# Patient Record
Sex: FEMALE | Race: WHITE | NOT HISPANIC OR LATINO
[De-identification: names, ages, dates, MRNs, and addresses within clinical notes are randomized per-mention and may not be internally consistent; named-entity substitution may affect disease eponyms.]

---

## 2018-04-30 ENCOUNTER — APPOINTMENT (OUTPATIENT)
Dept: PHYSICAL MEDICINE AND REHAB | Facility: CLINIC | Age: 48
End: 2018-04-30
Payer: MEDICAID

## 2018-04-30 VITALS — BODY MASS INDEX: 20.49 KG/M2 | RESPIRATION RATE: 16 BRPM | HEIGHT: 64 IN | WEIGHT: 120 LBS

## 2018-04-30 PROCEDURE — 20550 NJX 1 TENDON SHEATH/LIGAMENT: CPT | Mod: 59

## 2018-04-30 PROCEDURE — 76942 ECHO GUIDE FOR BIOPSY: CPT | Mod: LT,59

## 2018-04-30 PROCEDURE — 20611 DRAIN/INJ JOINT/BURSA W/US: CPT | Mod: LT

## 2018-04-30 PROCEDURE — 99204 OFFICE O/P NEW MOD 45 MIN: CPT | Mod: 25

## 2018-07-19 ENCOUNTER — APPOINTMENT (OUTPATIENT)
Dept: PHYSICAL MEDICINE AND REHAB | Facility: CLINIC | Age: 48
End: 2018-07-19

## 2018-08-30 ENCOUNTER — APPOINTMENT (OUTPATIENT)
Dept: PHYSICAL MEDICINE AND REHAB | Facility: CLINIC | Age: 48
End: 2018-08-30
Payer: MEDICAID

## 2018-08-30 VITALS — RESPIRATION RATE: 16 BRPM | BODY MASS INDEX: 20.49 KG/M2 | WEIGHT: 120 LBS | HEIGHT: 64 IN

## 2018-08-30 PROCEDURE — 20611 DRAIN/INJ JOINT/BURSA W/US: CPT | Mod: LT

## 2018-08-30 PROCEDURE — 99214 OFFICE O/P EST MOD 30 MIN: CPT | Mod: 25

## 2018-11-15 ENCOUNTER — EMERGENCY (EMERGENCY)
Facility: HOSPITAL | Age: 48
LOS: 1 days | Discharge: HOME | End: 2018-11-15
Attending: EMERGENCY MEDICINE | Admitting: EMERGENCY MEDICINE
Payer: MEDICAID

## 2018-11-15 VITALS
HEART RATE: 79 BPM | TEMPERATURE: 98 F | RESPIRATION RATE: 16 BRPM | OXYGEN SATURATION: 98 % | DIASTOLIC BLOOD PRESSURE: 88 MMHG | SYSTOLIC BLOOD PRESSURE: 115 MMHG

## 2018-11-15 VITALS
OXYGEN SATURATION: 97 % | DIASTOLIC BLOOD PRESSURE: 85 MMHG | HEART RATE: 92 BPM | SYSTOLIC BLOOD PRESSURE: 126 MMHG | RESPIRATION RATE: 18 BRPM | TEMPERATURE: 97 F

## 2018-11-15 DIAGNOSIS — J02.9 ACUTE PHARYNGITIS, UNSPECIFIED: ICD-10-CM

## 2018-11-15 DIAGNOSIS — Z91.018 ALLERGY TO OTHER FOODS: ICD-10-CM

## 2018-11-15 DIAGNOSIS — M25.512 PAIN IN LEFT SHOULDER: ICD-10-CM

## 2018-11-15 LAB
APPEARANCE UR: CLEAR — SIGNIFICANT CHANGE UP
BILIRUB UR-MCNC: NEGATIVE — SIGNIFICANT CHANGE UP
COLOR SPEC: YELLOW — SIGNIFICANT CHANGE UP
DIFF PNL FLD: ABNORMAL
GLUCOSE UR QL: NEGATIVE — SIGNIFICANT CHANGE UP
KETONES UR-MCNC: NEGATIVE — SIGNIFICANT CHANGE UP
LEUKOCYTE ESTERASE UR-ACNC: ABNORMAL
NITRITE UR-MCNC: NEGATIVE — SIGNIFICANT CHANGE UP
PH UR: 6 — SIGNIFICANT CHANGE UP (ref 5–8)
PROT UR-MCNC: NEGATIVE MG/DL — SIGNIFICANT CHANGE UP
S PYO AG SPEC QL IA: NEGATIVE — SIGNIFICANT CHANGE UP
SP GR SPEC: <=1.005 — SIGNIFICANT CHANGE UP (ref 1–1.03)
UROBILINOGEN FLD QL: 0.2 E.U./DL — SIGNIFICANT CHANGE UP

## 2018-11-15 PROCEDURE — 99283 EMERGENCY DEPT VISIT LOW MDM: CPT

## 2018-11-15 PROCEDURE — 73030 X-RAY EXAM OF SHOULDER: CPT | Mod: 26,LT

## 2018-11-15 RX ORDER — KETOROLAC TROMETHAMINE 30 MG/ML
30 SYRINGE (ML) INJECTION ONCE
Qty: 0 | Refills: 0 | Status: DISCONTINUED | OUTPATIENT
Start: 2018-11-15 | End: 2018-11-15

## 2018-11-15 RX ADMIN — Medication 30 MILLIGRAM(S): at 15:08

## 2018-11-15 NOTE — ED ADULT TRIAGE NOTE - CHIEF COMPLAINT QUOTE
pt states she has left arm pain x 1 year but aggravated it yesterday helping someone with a shopping cart, also has a sore throat with a horse voice, and thinks she may have a UTI.

## 2018-11-15 NOTE — ED PROVIDER NOTE - OBJECTIVE STATEMENT
48 year old female presents to ED with concern for left shoulder discomfort after pulling/lifting movement yesterday evening.  Patient states she has a previous injury to left shoulder which she is followed by an orthopedist for and feels that she exacerbated this pain yesterday.  She denies direct trauma to arm, neck pain, paresthesias into extremity, decreased  strength or changes in ability to range shoulder.  She does note increased pain with range of motion, however full ROM is intact.  She took aleeve yesterday for her discomfort with some improvement in pain. 48 year old female presents to ED with concern for left shoulder discomfort after pulling/lifting movement yesterday evening.  Patient states she has a previous injury to left shoulder which she is followed by an orthopedist for and feels that she exacerbated this pain yesterday.  She denies direct trauma to arm, neck pain, paresthesias into extremity, decreased  strength or changes in ability to range shoulder.  She does note increased pain with range of motion, however full ROM is intact.  She took aleeve yesterday for her discomfort with some improvement in pain.  She also complains of sore throat and is concerned she may have a UTI.  She denies increased frequency of urination, fever, chills or back pain.  + Intermittent bladder fullness.

## 2018-11-15 NOTE — ED PROVIDER NOTE - CARE PROVIDER_API CALL
Bharat Floyd (MD), [facility]  Orthopedics  [address]  [address] [place] [address]  Phone: [phone]  Fax: [phone]

## 2018-11-15 NOTE — ED PROVIDER NOTE - MUSCULOSKELETAL, MLM
Spine appears normal.  There is no midline cervical, thoracic or lumbar spine pain/tenderness/stepoff/deformity.  Range of motion is not limited, however + pain with complete ABduction of left UE.  + TTP over anterior lateral aspect of left shoulder.  No deformity.

## 2018-11-15 NOTE — ED PROVIDER NOTE - CARE PLAN
Principal Discharge DX:	Left shoulder pain, unspecified chronicity  Secondary Diagnosis:	Sore throat

## 2018-11-15 NOTE — ED PROVIDER NOTE - MEDICAL DECISION MAKING DETAILS
Patient in ED w c/o shoulder pain, possible UTI and sore throat.  UA with small amount of bacteria - cx sent.  I spoke w pt re small amount of bacteria, she denies dyuria or any urinary symptoms at this time and states "abx really mess me up, I don't want to take them if I don't have to."  Will await cx results prior to treating given patient now stating she is asymptomatic.  X ray appears unremarkable.  Orthopedist in ED and also reviews images.  Patient is requesting a new orthopedist, and Dr. Floyd does stop by to see patient while she is in ED.  Will discharge at this time w instruction for prompt ortho follow up as she is aware she will likely requre another MRI to eval for any further injury.  Will provide sling as patient is stating she cannot leave without one.  She is warned re concern for development of frozen shoulder and will only wear part time.  Will also provide percocet for breakthrough pain and patient stating she will take tylenol as needed - noting she does not like motrin.  Patient is aware of plan and agreeable.  Will discharge at this time.

## 2018-11-15 NOTE — ED PROVIDER NOTE - NEUROLOGICAL, MLM
Alert and oriented, no focal deficits, no motor or sensory deficits.  5/5  strength to bilateral UEs without discrepancy.  Sensation intact and symmetric to bilateral UEs.  Radial pulses are intact and symmetric to bilateral UEs.

## 2018-11-17 RX ORDER — FLUCONAZOLE 150 MG/1
1 TABLET ORAL
Qty: 1 | Refills: 2 | OUTPATIENT
Start: 2018-11-17 | End: 2018-11-19

## 2018-11-17 RX ORDER — SACCHAROMYCES BOULARDII 250 MG
1 POWDER IN PACKET (EA) ORAL
Qty: 30 | Refills: 2 | OUTPATIENT
Start: 2018-11-17 | End: 2019-02-14

## 2018-11-17 RX ORDER — CEFUROXIME AXETIL 250 MG
1 TABLET ORAL
Qty: 6 | Refills: 0 | OUTPATIENT
Start: 2018-11-17 | End: 2018-11-19

## 2018-11-19 ENCOUNTER — APPOINTMENT (OUTPATIENT)
Dept: PHYSICAL MEDICINE AND REHAB | Facility: CLINIC | Age: 48
End: 2018-11-19
Payer: MEDICAID

## 2018-11-19 VITALS
RESPIRATION RATE: 16 BRPM | HEART RATE: 113 BPM | HEIGHT: 64 IN | WEIGHT: 120 LBS | BODY MASS INDEX: 20.49 KG/M2 | OXYGEN SATURATION: 98 %

## 2018-11-19 PROCEDURE — 99214 OFFICE O/P EST MOD 30 MIN: CPT

## 2018-11-19 RX ORDER — NAPROXEN 500 MG/1
500 TABLET ORAL
Qty: 60 | Refills: 1 | Status: DISCONTINUED | COMMUNITY
Start: 2018-08-30 | End: 2018-11-19

## 2019-10-12 ENCOUNTER — EMERGENCY (EMERGENCY)
Facility: HOSPITAL | Age: 49
LOS: 1 days | Discharge: HOME | End: 2019-10-12
Attending: EMERGENCY MEDICINE | Admitting: EMERGENCY MEDICINE
Payer: MEDICAID

## 2019-10-12 VITALS
DIASTOLIC BLOOD PRESSURE: 81 MMHG | HEART RATE: 97 BPM | SYSTOLIC BLOOD PRESSURE: 123 MMHG | RESPIRATION RATE: 16 BRPM | WEIGHT: 130.07 LBS | TEMPERATURE: 98 F | OXYGEN SATURATION: 98 %

## 2019-10-12 PROCEDURE — 99283 EMERGENCY DEPT VISIT LOW MDM: CPT

## 2019-10-12 PROCEDURE — 73030 X-RAY EXAM OF SHOULDER: CPT | Mod: 26,LT

## 2019-10-12 NOTE — ED PROVIDER NOTE - PATIENT PORTAL LINK FT
You can access the FollowMyHealth Patient Portal offered by [facility] by registering at the following website: [email or web address]. By joining [employer]’s FollowMyHealth portal, you will also be able to view your health information using other applications (apps) compatible with our system.

## 2019-10-12 NOTE — ED PROVIDER NOTE - OBJECTIVE STATEMENT
48 y/o Female presents to ED c/o L shoulder pain and a sore throat. Pt states her children were sick last week and she may have obtained their cold. Denies fever, chill, cough, and congestion. Pt states she tore part of her L bicep in the past and currently feels discomfort in her L shoulder. Denies numbness and weakness. Pt is compliant with going to her physical therapy appointments and visiting her orthopedist. Pt is requesting for an XR.

## 2019-10-12 NOTE — ED PROVIDER NOTE - CLINICAL SUMMARY MEDICAL DECISION MAKING FREE TEXT BOX
50 y/o Female presents to ED with sore throat and L shoulder pain. Will order L shoulder XR. Advised pt to take Tylenol for pain and if she obtains a fever. Will give pt a sling and advised her to follow up with orthopedist.

## 2019-10-12 NOTE — ED PROVIDER NOTE - CARE PLAN
Principal Discharge DX:	Viral pharyngitis  Secondary Diagnosis:	Shoulder strain, left, initial encounter

## 2019-10-17 ENCOUNTER — APPOINTMENT (OUTPATIENT)
Dept: PHYSICAL MEDICINE AND REHAB | Facility: CLINIC | Age: 49
End: 2019-10-17
Payer: MEDICAID

## 2019-10-17 VITALS
WEIGHT: 120 LBS | HEART RATE: 91 BPM | OXYGEN SATURATION: 98 % | HEIGHT: 64 IN | RESPIRATION RATE: 16 BRPM | BODY MASS INDEX: 20.49 KG/M2
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
## 2019-10-17 DIAGNOSIS — J02.9 ACUTE PHARYNGITIS, UNSPECIFIED: ICD-10-CM

## 2019-10-17 DIAGNOSIS — S46.912A STRAIN OF UNSPECIFIED MUSCLE, FASCIA AND TENDON AT SHOULDER AND UPPER ARM LEVEL, LEFT ARM, INITIAL ENCOUNTER: ICD-10-CM
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
## 2019-10-17 DIAGNOSIS — M25.512 PAIN IN LEFT SHOULDER: ICD-10-CM
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
## 2019-10-17 PROCEDURE — 99214 OFFICE O/P EST MOD 30 MIN: CPT

## 2021-02-17 NOTE — ED POST DISCHARGE NOTE - DETAILS
Still having sx, hx freq UTIs, dn want macrobid. Ceftin Rx sent. Quality 110: Preventive Care And Screening: Influenza Immunization: Influenza immunization was not ordered or administered, reason not given Detail Level: Detailed Quality 226: Preventive Care And Screening: Tobacco Use: Screening And Cessation Intervention: Patient screened for tobacco use and is an ex/non-smoker Quality 130: Documentation Of Current Medications In The Medical Record: Current Medications Documented Quality 431: Preventive Care And Screening: Unhealthy Alcohol Use - Screening: Patient screened for unhealthy alcohol use using a single question and scores less than 2 times per year Quality 131: Pain Assessment And Follow-Up: Pain assessment using a standardized tool is documented as negative, no follow-up plan required

## 2021-03-30 NOTE — ED ADULT NURSE NOTE - NSSISCREENINGQ4_ED_A_ED
"    Assessment & Plan     Flank pain  Transient with echoes of his past kidney stone. UA negative today.  Plan to do US to assess for hydronephrosis. If positive, might warrant repeat CT scan.     - Urinalysis, Micro If (UA) ([facility])  - US Renal Complete; Future    Urgency incontinence and leakage  His PVR was negative post voiding today.   No evidence of back pain or neurologic deficit.  Prostate does not feel large, minimal BPH symptoms, and past low PSA. Repeat today.   His resting tone slightly lower with ability to tense so reviewed doing frequent Kegels. Has appointment with Urology in the next 6 weeks and will help them determine next steps if improved.    To use the nystatin as needed if increased irritation and aware that needs to be seen more urgently if balanitis findings.   I will see if there are providers in urology he can see here.   - nystatin (MYCOSTATIN) 165043 UNIT/GM external cream; Apply topically 4 times daily as needed for dry skin  - Prostate spec antigen screen    Gender dysphoria  - working with Dr. Pierre through .              BMI:   Estimated body mass index is 34.16 kg/m  as calculated from the following:    Height as of this encounter: 1.73 m (5' 8.11\").    Weight as of this encounter: 102.2 kg (225 lb 6.4 oz).           No follow-ups on file.    Yanna Santizo MD  [facility] ASHLEY Hill is a 63 year old who presents for the following health issues     HPI   Has had frequent, small amount of urinary incontinence for 3 months - wearing shields to deal with that. Out of the blue, if squats and stands up, in bed at night. Finds herself wet.  Kegels - has started recently and not all the time.   Doesn't feel like she fully empties - has an urge and there is very little and then the urge has not gone away.   Nocturia - up at one, three and six and small volumes  Denies difficulty starting stream  No intimate partner for a long time  Can get an erection if " "works at it but difficult and rare to orgasm. Not much matsturbation. Would really like to have a relationship.   Then noted pain at the tip of her penis - can be there anytime, is happening now, can be after urinating.  Not there all the time. 2 - 6 times. Sometimes a second and sometimes it persists and tries to urinate to see if that helps, cleans but doesn't help.     Had UA which was negative a few weeks ago.     Middle of last week felt like had kidney stone episode. Woke up at 1 am then by 3 am lots of pain and had to vomit and then back.  Abdomen and front right groin, eventually migrated to right flank.  Very similar to episode in 2011 when diagnosed with 2 stones.  No blood in urine. Not seen. Work up at that time included cystoscopy.   1 year ago in May had ED visit for stone. CT positive for 0.6 to 0.3 cm stone in right UVJ. Never passed the stone.      Nl UA recently and [facility] BMP [facility].   [facility]  Also some family history - my Dad who is 94 (Mom is 97) confirmed that his bladder cancer happened in 1999 and came back in 2000 - he would have been a young 72 back then, and is still a pretty young 94. His history also includes gallbladder, a double bypass, a stent, a pacemaker,  oh, and his tonsillectomy in 1930.  - thanks, Liz            Review of Systems         Objective    /79   Pulse 61   Temp 98.7  F (37.1  C) (Oral)   Ht 1.73 m (5' 8.11\")   Wt 102.2 kg (225 lb 6.4 oz)   SpO2 95%   BMI 34.16 kg/m    Body mass index is 34.16 kg/m .  Physical Exam   GENERAL: healthy, alert and no distress  CV: regular rate and rhythm, normal S1 S2, no S3 or S4, no murmur, click or rub, no peripheral edema and peripheral pulses strong  : small penis with redundant foreskin that is wet on exam. Urethra without irritation and meatus appears normal size. Testes smaller, soft, nt, Nl sensation over the perineum  RECTAL (male): lower resting sphincter tone with normal ability to tighten, no " rectal masses, prostate normal size, smooth, nontender without nodules or masses  MS: no gross musculoskeletal defects noted, no edema    Results for orders placed or performed in visit on 03/30/21 (from the past 24 hour(s))   Urinalysis, Micro If (UA) ([facility])   Result Value Ref Range    Specific Gravity Urine >=1.030 1.005 - 1.030    pH Urine 5.0 4.5 - 8.0    Leukocyte Esterase UR Negative NEGATIVE    Nitrite Urine Negative NEGATIVE mg/dL    Protein UR Negative NEGATIVE mg/dL    Glucose Urine Negative NEGATIVE    Ketones Urine Negative NEGATIVE mg/dL    Urobilinogen mg/dL 0.2 E.U./dL 0.2 E.U./dL E.U./dL    Bilirubin UR Negative NEGATIVE mg/dL    Blood UR Negative NEGATIVE mg/dL                No

## 2021-11-19 ENCOUNTER — EMERGENCY (EMERGENCY)
Facility: HOSPITAL | Age: 51
LOS: 1 days | Discharge: HOME | End: 2021-11-19
Admitting: EMERGENCY MEDICINE
Payer: MEDICAID

## 2021-11-19 VITALS
OXYGEN SATURATION: 100 % | HEART RATE: 68 BPM | DIASTOLIC BLOOD PRESSURE: 78 MMHG | SYSTOLIC BLOOD PRESSURE: 107 MMHG | RESPIRATION RATE: 18 BRPM

## 2021-11-19 VITALS
DIASTOLIC BLOOD PRESSURE: 81 MMHG | HEART RATE: 105 BPM | TEMPERATURE: 98 F | SYSTOLIC BLOOD PRESSURE: 133 MMHG | RESPIRATION RATE: 18 BRPM | OXYGEN SATURATION: 96 %
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
## 2021-11-19 DIAGNOSIS — Z91.018 ALLERGY TO OTHER FOODS: ICD-10-CM

## 2021-11-19 DIAGNOSIS — S92.912A UNSPECIFIED FRACTURE OF LEFT TOE(S), INITIAL ENCOUNTER FOR CLOSED FRACTURE: ICD-10-CM

## 2021-11-19 DIAGNOSIS — M79.675 PAIN IN LEFT TOE(S): ICD-10-CM

## 2021-11-19 PROCEDURE — 73630 X-RAY EXAM OF FOOT: CPT | Mod: 26,LT

## 2021-11-19 PROCEDURE — 73660 X-RAY EXAM OF TOE(S): CPT | Mod: 26,LT,59

## 2021-11-19 PROCEDURE — 99284 EMERGENCY DEPT VISIT MOD MDM: CPT | Mod: 25

## 2021-11-19 RX ORDER — SUMATRIPTAN SUCCINATE 4 MG/.5ML
6 INJECTION, SOLUTION SUBCUTANEOUS ONCE
Refills: 0 | Status: COMPLETED | OUTPATIENT
Start: 2021-11-19 | End: 2021-11-19

## 2021-11-19 RX ORDER — KETOROLAC TROMETHAMINE 30 MG/ML
15 SYRINGE (ML) INJECTION ONCE
Refills: 0 | Status: DISCONTINUED | OUTPATIENT
Start: 2021-11-19 | End: 2021-11-19

## 2021-11-19 RX ADMIN — Medication 15 MILLIGRAM(S): at 22:19

## 2021-11-19 RX ADMIN — SUMATRIPTAN SUCCINATE 6 MILLIGRAM(S): 4 INJECTION, SOLUTION SUBCUTANEOUS at 22:19

## 2021-11-19 NOTE — ED PROVIDER NOTE - CARE PROVIDER_API CALL
Estrellita Paniagua (EDMAR)  Surgery Orthopaedic Surgery  [address] [place] [address], [address]  [place] [address]  Phone: [phone]  Fax: [phone]  Follow Up Time:

## 2021-11-19 NOTE — ED PROVIDER NOTE - CLINICAL SUMMARY MEDICAL DECISION MAKING FREE TEXT BOX
pt presents c/o left foot pain, mostly the 5th digit, also with migraine. previously on triptan but none taken today. will give toradol and sumatriptan. will obtain xray foot and toes.     [facility] shows interval healing of the sesamoid bone but worsening of the spiral fracture of the 5th digit along the same fracture line.

## 2021-11-19 NOTE — ED ADULT TRIAGE NOTE - CHIEF COMPLAINT QUOTE
Pt walked in c/o L fifth toe pain. States struck toe against furniture. Hx of fracture to the same toe in july 2020.

## 2021-11-19 NOTE — ED PROVIDER NOTE - NSFOLLOWUPINSTRUCTIONS_ED_ALL_ED_FT
Fracture    A fracture is a break in one of your bones. This can occur from a variety of injuries, especially traumatic ones. Symptoms include pain, bruising, or swelling. Do not use the injured limb. If a fracture is in one of the bones below your waist, do not put weight on that limb unless instructed to do so by your healthcare provider. Crutches or a cane may have been provided. A splint or cast may have been applied by your health care provider. Make sure to keep it dry and follow up with an orthopedist as instructed.    SEEK IMMEDIATE MEDICAL CARE IF YOU HAVE ANY OF THE FOLLOWING SYMPTOMS: numbness, tingling, increasing pain, or weakness in any part of the injured limb.    CONTINUE TO FRIDA TAPE YOUR TOES AS WE DID WITH GAUZE BETWEEN TO HELP RESIST ROTATION.     WEAR HARD SOLE SHOE.     FOLLOW UP WITH PODIATRIST. OUR CARE COORDINATOR WILL REACH OUT TO YOU IN THE NEXT COUPLE OF DAYS TO HELP FACILITATE AN APPOINTMENT.

## 2021-11-19 NOTE — ED PROVIDER NOTE - PHYSICAL EXAMINATION
Constitutional: Well appearing, awake, alert, oriented to person, place, time/situation and in no apparent distress.  HEENT: Airway patent, NCAT  Resp: no conversational dyspnea, tachypnea, or signs of respiratory distress  Msk: left foot with bruising of the 5th digit and the base of the 4th digit with deformity noted, medially rotated, no other focal bony tenderness, but occasional tenderness over the ball of the foot, no tenderness to the metatarsals  Neuro: A&Ox3

## 2021-11-19 NOTE — ED PROVIDER NOTE - PATIENT PORTAL LINK FT
You can access the FollowMyHealth Patient Portal offered by [facility] by registering at the following website: [email or web address]. By joining [employer]’s FollowMyHealth portal, you will also be able to view your health information using other applications (apps) compatible with our system.

## 2021-11-19 NOTE — ED PROVIDER NOTE - OBJECTIVE STATEMENT
52yo F with h/o multiple fractured toes and migraines presents with c/o left 5th toe fracture 3 days ago. pt notes she banged her toe into a piece of furniture and she had a spiral fracture to the toe earlier this year as well as a sesamoid bone fracture earlier this year. pt describes pain diffusely to her foot. she also notes it has triggered a migraine for which she used to take triptans but was unable to continue her rx 2/2 insurance issues. 50yo F with h/o multiple fractured toes and migraines presents with c/o left 5th toe fracture 3 days ago. pt notes she banged her toe into a piece of furniture and she had a spiral fracture to the toe earlier this year as well as a sesamoid bone fracture earlier this year. pt describes pain diffusely to her foot. she also notes it has triggered a migraine for which she used to take triptans but was unable to continue her rx 2/2 insurance issues. reports symptoms are typical of her migraines, no new symptoms, no vomiting, dizziness, cp, sob.

## 2021-11-19 NOTE — ED ADULT NURSE NOTE - NSIMPLEMENTINTERV_GEN_ALL_ED
Implemented All Universal Safety Interventions:  [place] to call system. Call bell, personal items and telephone within reach. Instruct patient to call for assistance. Room bathroom lighting operational. Non-slip footwear when patient is off stretcher. Physically safe environment: no spills, clutter or unnecessary equipment. Stretcher in lowest position, wheels locked, appropriate side rails in place.

## 2021-11-19 NOTE — ED PROVIDER NOTE - PROGRESS NOTE DETAILS
reviewed old x ray on [facility] which shows interval healing of the sesamoid bone but worsening of the spiral fracture of the 5th digit along the same fracture line.

## 2021-12-13 ENCOUNTER — EMERGENCY (EMERGENCY)
Facility: HOSPITAL | Age: 51
LOS: 1 days | Discharge: HOME | End: 2021-12-13
Admitting: EMERGENCY MEDICINE
Payer: MEDICAID

## 2021-12-13 VITALS
WEIGHT: 130.07 LBS | OXYGEN SATURATION: 95 % | HEART RATE: 93 BPM | SYSTOLIC BLOOD PRESSURE: 121 MMHG | HEIGHT: 64 IN | TEMPERATURE: 98 F | DIASTOLIC BLOOD PRESSURE: 66 MMHG | RESPIRATION RATE: 16 BRPM

## 2021-12-13 VITALS
TEMPERATURE: 98 F | HEART RATE: 86 BPM | DIASTOLIC BLOOD PRESSURE: 73 MMHG | SYSTOLIC BLOOD PRESSURE: 128 MMHG | RESPIRATION RATE: 18 BRPM | OXYGEN SATURATION: 97 %

## 2021-12-13 DIAGNOSIS — M79.672 PAIN IN LEFT FOOT: ICD-10-CM
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
## 2021-12-13 DIAGNOSIS — S01.81XA LACERATION WITHOUT FOREIGN BODY OF OTHER PART OF HEAD, INITIAL ENCOUNTER: ICD-10-CM
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
## 2021-12-13 DIAGNOSIS — Z23 ENCOUNTER FOR IMMUNIZATION: ICD-10-CM
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
## 2021-12-13 DIAGNOSIS — Z87.81 PERSONAL HISTORY OF (HEALED) TRAUMATIC FRACTURE: ICD-10-CM

## 2021-12-13 PROBLEM — G43.909 MIGRAINE, UNSPECIFIED, NOT INTRACTABLE, WITHOUT STATUS MIGRAINOSUS: Chronic | Status: ACTIVE | Noted: 2021-11-19

## 2021-12-13 PROCEDURE — 73630 X-RAY EXAM OF FOOT: CPT | Mod: 26,LT

## 2021-12-13 PROCEDURE — 73620 X-RAY EXAM OF FOOT: CPT | Mod: 26,LT

## 2021-12-13 PROCEDURE — 99283 EMERGENCY DEPT VISIT LOW MDM: CPT | Mod: 25

## 2021-12-13 RX ORDER — TETANUS TOXOID, REDUCED DIPHTHERIA TOXOID AND ACELLULAR PERTUSSIS VACCINE, ADSORBED 5; 2.5; 8; 8; 2.5 [IU]/.5ML; [IU]/.5ML; UG/.5ML; UG/.5ML; UG/.5ML
0.5 SUSPENSION INTRAMUSCULAR ONCE
Refills: 0 | Status: COMPLETED | OUTPATIENT
Start: 2021-12-13 | End: 2021-12-13

## 2021-12-13 RX ADMIN — TETANUS TOXOID, REDUCED DIPHTHERIA TOXOID AND ACELLULAR PERTUSSIS VACCINE, ADSORBED 0.5 MILLILITER(S): 5; 2.5; 8; 8; 2.5 SUSPENSION INTRAMUSCULAR at 14:51

## 2021-12-13 NOTE — ED PROVIDER NOTE - CARE PROVIDER_API CALL
Estrellita Paniagua (EDMAR)  Surgery Orthopaedic Surgery  [address] [place] [address], [address]  [place] [address]  Phone: [phone]  Fax: [phone]  Follow Up Time:    Estrellita Paniagua)  Surgery Orthopaedic Surgery  [address] [place] [address], [address]  [place] [address]  Phone: [phone]  Fax: [phone]  Follow Up Time:     Semaj Medina)  Orthopaedic Surgery  [address] [place] [address]  Phone: [phone]  Fax: [phone]  Follow Up Time:

## 2021-12-13 NOTE — ED ADULT NURSE NOTE - NSIMPLEMENTINTERV_GEN_ALL_ED
Implemented All Universal Safety Interventions:  [place] to call system. Call bell, personal items and telephone within reach. Instruct patient to call for assistance. Room bathroom lighting operational. Non-slip footwear when patient is off stretcher. Physically safe environment: no spills, clutter or unnecessary equipment. Stretcher in lowest position, wheels locked, appropriate side rails in place.

## 2021-12-13 NOTE — ED PROVIDER NOTE - OBJECTIVE STATEMENT
50 y/o female with PMHx of chronic foot pain, with left 5th toe fracture and left sesamoid bone fracture sustained 1 month ago presents with laceration tot he forehead. Patient states that at 12 midnight last night, she was vacuuming on her bed and hit her head with a metal cannister of the vacuum. Patient denies fall, LOC, nausea, vomiting, and her last tetanus shot is unknown. Patient presents with laceration, and states that since she couldn't get a follow up appointment with her podiatrist, she is also requesting a re-evaluation of her foot.

## 2021-12-13 NOTE — ED ADULT TRIAGE NOTE - CHIEF COMPLAINT QUOTE
Pt c/o head injury. Pulled Metal canister from vaccum  inter her forehead. Lac to forehead. No LOC, no blood thinners. Separately. Pt still experiencing Lt toe pain from fracture one month ago

## 2021-12-13 NOTE — ED PROVIDER NOTE - CARE PROVIDERS DIRECT ADDRESSES
,DirectAddress_Unknown ,DirectAddress_Unknown,[email or web address]

## 2021-12-13 NOTE — ED PROVIDER NOTE - PATIENT PORTAL LINK FT
You can access the FollowMyHealth Patient Portal offered by [facility] by registering at the following website: [email or web address]. By joining [employer]’s FollowMyHealth portal, you will also be able to view your health information using other applications (apps) compatible with our system.

## 2021-12-13 NOTE — ED ADULT NURSE NOTE - CHIEF COMPLAINT
Pt sustained head impact with vacuum canister and hit her forehead. Pt has small laceration to area. pt did not sustain loc and denies blood thinner use. Pt denies dizziness, headache, n/v, sob, fever/chills, cough.

## 2021-12-13 NOTE — ED PROVIDER NOTE - PROVIDER TOKENS
PROVIDER:[TOKEN:[[id number]]] PROVIDER:[TOKEN:[[id number]]],PROVIDER:[TOKEN:[[id number]]]

## 2021-12-13 NOTE — ED PROVIDER NOTE - CLINICAL SUMMARY MEDICAL DECISION MAKING FREE TEXT BOX
Patient with superficial laceration over the forehead sustained last night at midnight. Wound irrigated with normal saline a betadine. Steri-strips applied, tetanus updated, repeat X-rays of foot and toe negative for acute changes. Advised follow up with podiatry.

## 2021-12-13 NOTE — ED PROVIDER NOTE - SKIN, MLM
+superficial laceration to the forehead near the hairline which is 2 cm in length with no active bleeding, no edema, no erythema. Left foot skin intact with no ecchymosis or edema.

## 2022-12-11 ENCOUNTER — EMERGENCY (EMERGENCY)
Facility: HOSPITAL | Age: 52
LOS: 1 days | Discharge: HOME | End: 2022-12-11
Admitting: EMERGENCY MEDICINE
Payer: COMMERCIAL

## 2022-12-11 VITALS
RESPIRATION RATE: 16 BRPM | SYSTOLIC BLOOD PRESSURE: 125 MMHG | OXYGEN SATURATION: 97 % | HEIGHT: 64 IN | DIASTOLIC BLOOD PRESSURE: 76 MMHG | HEART RATE: 86 BPM | WEIGHT: 130.07 LBS | TEMPERATURE: 98 F

## 2022-12-11 DIAGNOSIS — Z91.018 ALLERGY TO OTHER FOODS: ICD-10-CM
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
## 2022-12-11 DIAGNOSIS — S80.01XA CONTUSION OF RIGHT KNEE, INITIAL ENCOUNTER: ICD-10-CM

## 2022-12-11 DIAGNOSIS — R45.1 RESTLESSNESS AND AGITATION: ICD-10-CM
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
## 2022-12-11 DIAGNOSIS — S80.02XA CONTUSION OF LEFT KNEE, INITIAL ENCOUNTER: ICD-10-CM

## 2022-12-11 DIAGNOSIS — S60.052A CONTUSION OF LEFT LITTLE FINGER WITHOUT DAMAGE TO NAIL, INITIAL ENCOUNTER: ICD-10-CM

## 2022-12-11 DIAGNOSIS — M25.522 PAIN IN LEFT ELBOW: ICD-10-CM

## 2022-12-11 DIAGNOSIS — G43.909 MIGRAINE, UNSPECIFIED, NOT INTRACTABLE, WITHOUT STATUS MIGRAINOSUS: ICD-10-CM

## 2022-12-11 DIAGNOSIS — F41.9 ANXIETY DISORDER, UNSPECIFIED: ICD-10-CM

## 2022-12-11 DIAGNOSIS — M25.512 PAIN IN LEFT SHOULDER: ICD-10-CM

## 2022-12-11 DIAGNOSIS — R07.81 PLEURODYNIA: ICD-10-CM

## 2022-12-11 PROCEDURE — 73030 X-RAY EXAM OF SHOULDER: CPT

## 2022-12-11 PROCEDURE — 73140 X-RAY EXAM OF FINGER(S): CPT

## 2022-12-11 PROCEDURE — 71100 X-RAY EXAM RIBS UNI 2 VIEWS: CPT | Mod: 26,LT

## 2022-12-11 PROCEDURE — 99284 EMERGENCY DEPT VISIT MOD MDM: CPT | Mod: 25

## 2022-12-11 PROCEDURE — 73562 X-RAY EXAM OF KNEE 3: CPT | Mod: 26,50

## 2022-12-11 PROCEDURE — 73080 X-RAY EXAM OF ELBOW: CPT

## 2022-12-11 PROCEDURE — 73140 X-RAY EXAM OF FINGER(S): CPT | Mod: 26,LT

## 2022-12-11 PROCEDURE — 71100 X-RAY EXAM RIBS UNI 2 VIEWS: CPT

## 2022-12-11 PROCEDURE — 73080 X-RAY EXAM OF ELBOW: CPT | Mod: 26,LT

## 2022-12-11 PROCEDURE — 73030 X-RAY EXAM OF SHOULDER: CPT | Mod: 26

## 2022-12-11 PROCEDURE — 73030 X-RAY EXAM OF SHOULDER: CPT | Mod: 26,LT

## 2022-12-11 PROCEDURE — 73562 X-RAY EXAM OF KNEE 3: CPT

## 2022-12-11 PROCEDURE — 71046 X-RAY EXAM CHEST 2 VIEWS: CPT

## 2022-12-11 PROCEDURE — 71046 X-RAY EXAM CHEST 2 VIEWS: CPT | Mod: 26

## 2022-12-11 PROCEDURE — 96372 THER/PROPH/DIAG INJ SC/IM: CPT

## 2022-12-11 RX ORDER — KETOROLAC TROMETHAMINE 30 MG/ML
15 SYRINGE (ML) INJECTION ONCE
Refills: 0 | Status: DISCONTINUED | OUTPATIENT
Start: 2022-12-11 | End: 2022-12-11

## 2022-12-11 RX ADMIN — Medication 15 MILLIGRAM(S): at 15:42

## 2022-12-11 NOTE — ED ADULT TRIAGE NOTE - CHIEF COMPLAINT QUOTE
Patient c/o trip and fall this morning with pain to chano knees/left elbow/left pinky/left hip/left ankle.

## 2022-12-11 NOTE — ED PROVIDER NOTE - CLINICAL SUMMARY MEDICAL DECISION MAKING FREE TEXT BOX
Radiology clearance for chest and left rib series was done. Radiology clearance for chest and left rib series was done.  Patient s/p fall this morning presents in afternoon c/o multiple MSK pain. Pt was given toradol for pain while in ED. .  All Xrays  were negative and NSAID was rx to pharmacy for the pt. Pt report to still have pain and tylenol was offered but decline by pt. Given the circumstances of pt traveling with two young children and she was not being picked up it was not in the best interest of the patient to be given any narcotics at this time for safety reasons.

## 2022-12-11 NOTE — ED PROVIDER NOTE - PHYSICAL EXAMINATION
left:   Shoulder:  no swelling, deformity, erythema or ecchymosis, FROM with tenderness upon ranging along  the posterior aspect of shoulder.     Elbow:  No swelling, erythema, deformity or ecchymosis, + tenderness with ROM  and TTP on olecranon    Wrist/ hand : nontender    Fingers: 5th digit at DIP  + swelling, ecchymosis, TTP and upon  flexion.   Distal pulses intact , no motor or  sensory deficit.    Rib: + ttp at inferior ribs, no step off, crepitus, or ecchymosis. CTAB    Knee left : + swelling, ecchymosis, ttp, no deformity, patella midline  knee right : + swelling  L>R , Ttp , + mild ecchymosis.   Able to range bilateral  with discomfort.   Ambulating well

## 2022-12-11 NOTE — ED PROVIDER NOTE - WR INTERPRETATION 1
Bedside and Verbal shift change report given to Srikanth Mike (oncoming nurse) by Abdirahman Aguilar LPN (offgoing nurse). Report included the following information SBAR, Kardex and MAR. Qh visual checks and 24h chart checks done. MSK XR negative - No fracture, No dislocation, No foreign body

## 2022-12-11 NOTE — ED ADULT NURSE NOTE - NSIMPLEMENTINTERV_GEN_ALL_ED
Implemented All Fall Risk Interventions:  [place] to call system. Call bell, personal items and telephone within reach. Instruct patient to call for assistance. Room bathroom lighting operational. Non-slip footwear when patient is off stretcher. Physically safe environment: no spills, clutter or unnecessary equipment. Stretcher in lowest position, wheels locked, appropriate side rails in place. Provide visual cue, wrist band, yellow gown, etc. Monitor gait and stability. Monitor for mental status changes and reorient to person, place, and time. Review medications for side effects contributing to fall risk. Reinforce activity limits and safety measures with patient and family.

## 2022-12-11 NOTE — ED PROVIDER NOTE - PATIENT PORTAL LINK FT
You can access the FollowMyHealth Patient Portal offered by Health system by registering at the following website: [email or web address]. By joining [employer]’s FollowMyHealth portal, you will also be able to view your health information using other applications (apps) compatible with our system.

## 2022-12-11 NOTE — ED PROVIDER NOTE - CHPI ED SYMPTOMS NEG
no abrasion/no back pain/no deformity/no numbness/no tingling/no weakness/no difficulty bearing weight

## 2022-12-11 NOTE — ED PROVIDER NOTE - NSFOLLOWUPINSTRUCTIONS_ED_ALL_ED_FT
Recommend cool compress 20 mins on/off 3-4 times a day. No heavy lifting , rest and hydrate.   Follow up with PMD.     If necessary consider ortho follow up if pain is persistent on knees.                  CONTUSION IN ADULTS - General Information            Contusion in Adults    WHAT YOU NEED TO KNOW:    What is a contusion? A contusion is a bruise that appears on your skin after an injury. A bruise happens when small blood vessels tear but skin does not. Blood leaks into nearby tissue, such as soft tissue or muscle.    What increases my risk for a contusion?   •A disorder that makes you bleed more easily      •Kidney or liver disease, or an infection      •Medicines such as blood thinners or certain over-the-counter medicines and herbal medicines      •Weakened skin and muscles from older age or poor nutrition      What other signs and symptoms may I have with a contusion?   •Pain that increases when you touch the bruise, walk, or use the area around the bruise      •Swelling or a lump at the site of the bruise or near it      •Red, blue, or black skin that may change to green or yellow after a few days      •Stiffness or problems moving the bruised area of your body      How is a contusion diagnosed? Your healthcare provider may ask about any injuries, infections, or bleeding problems you had in the past. He or she will check the skin over the injured area. He or she may touch it to see where it hurts. He or she may also check for problems you may have when you move your bruised area. You may need any of the following tests:   •Blood tests may be used to check for blood disorders or to see how long it takes for your blood to clot.       •Ultrasound pictures may show how deep the bruise is and if any of your organs, such as your liver, are injured.      •MRI pictures may show if a hematoma (pooling of blood) has started to form. You may be given contrast liquid to help the pictures show up better. Tell the healthcare provider if you have ever had an allergic reaction to contrast liquid. Do not enter the MRI room with anything metal. Metal can cause serious injury. Tell the healthcare provider if you have any metal in or on your body.      How is a contusion treated? Your bruise may heal without any treatment. Treatment depends on the part of your body that is injured, and how serious your injury is. You may need any of the following:  •NSAIDs, such as ibuprofen, help decrease swelling, pain, and fever. This medicine is available with or without a doctor's order. NSAIDs can cause stomach bleeding or kidney problems in certain people. If you take blood thinner medicine, always ask your healthcare provider if NSAIDs are safe for you. Always read the medicine label and follow directions.      •Prescription pain medicine may be given. Do not wait until the pain is severe before you take your medicine.      •Aspiration is a procedure to drain pooled blood in your muscle. This prevents increased pressure in the muscle.      •Surgery may be done to repair a tear in the muscle or relieve pressure in the muscle caused by swelling.      What can I do to help my contusion heal?   •Rest the injured area or use it less than usual. If you bruised your leg or foot, you may need crutches or a cane to help you walk. This will help you keep weight off your injured body part.      •Apply ice to decrease swelling and pain. Ice may also help prevent tissue damage. Use an ice pack, or put crushed ice in a plastic bag. Cover it with a towel and place it on your bruise for 15 to 20 minutes every hour or as directed.      •Use compression to support the area and decrease swelling. Wrap an elastic bandage around the area over the bruised muscle. Make sure the bandage is not too tight. You should be able to fit 1 finger between the bandage and your skin.      •Elevate (raise) your injured body part above the level of your heart to help decrease pain and swelling. Use pillows, blankets, or rolled towels to elevate the area as often as you can.      •Do not drink alcohol as directed. Alcohol may slow healing.      •Do not stretch injured muscles right after your injury. Ask your healthcare provider when and how you may safely stretch after your injury. Gentle stretches can help increase your flexibility.      •Do not massage the area or put heating pads on the bruise right after your injury. Heat and massage may slow healing. Your healthcare provider may tell you to apply heat after several days. At that time, heat will start to help the injury heal.      How can I prevent a contusion?   •Stretch and warm up before you play sports or exercise.      •Wear protective gear when you play sports. Examples are shin guards and padding.      •If you begin a new physical activity, start slowly to give your body a chance to adjust.      When should I seek immediate care?   •You have new trouble moving the injured area.      •You have tingling or numbness in or near the injured area.      •Your hand or foot below the bruise gets cold or turns pale.      When should I call my doctor?   •You find a new lump in the injured area.      •Your symptoms do not improve with treatment after 4 to 5 days.      •You have questions or concerns about your condition or care.      CARE AGREEMENT:    You have the right to help plan your care. Learn about your health condition and how it may be treated. Discuss treatment options with your healthcare providers to decide what care you want to receive. You always have the right to refuse treatment.        © Copyright [employer] 2022           back to top                          © Copyright [employer] 2022

## 2022-12-11 NOTE — ED ADULT NURSE NOTE - OBJECTIVE STATEMENT
53 YO F here for fall, injuries and pain to left hip ankle knee pinky finger and left ankle.  Ambulatory A&OX4 and in NAD.   obtained x rays.

## 2022-12-11 NOTE — ED PROVIDER NOTE - OBJECTIVE STATEMENT
51 y/o f with present c/o multiple MSK pain s/p trip and fell. Patient is very anxious and agitated upon encounter. She reports of tripping this morning and fell onto her knees and left side of her body. However she was able to get up and continue her day including going to her son's soccer game. At this time she expresses to be  in a lot of pain. She admits to pain to her left shoulder, elbow, ribs, left hand pinky, and  bilateral knees. Denies head injury, loc, n, v, sob, cp , and abd pain. No paresthesia ot paresis.

## 2023-03-01 ENCOUNTER — NON-APPOINTMENT (OUTPATIENT)
Age: 53
End: 2023-03-01

## 2023-03-12 ENCOUNTER — NON-APPOINTMENT (OUTPATIENT)
Age: 53
End: 2023-03-12

## 2023-04-19 ENCOUNTER — NON-APPOINTMENT (OUTPATIENT)
Age: 53
End: 2023-04-19

## 2023-04-21 ENCOUNTER — APPOINTMENT (OUTPATIENT)
Dept: FAMILY MEDICINE | Facility: CLINIC | Age: 53
End: 2023-04-21
Payer: MEDICAID

## 2023-04-21 ENCOUNTER — NON-APPOINTMENT (OUTPATIENT)
Age: 53
End: 2023-04-21

## 2023-04-21 VITALS
BODY MASS INDEX: 21.34 KG/M2 | OXYGEN SATURATION: 98 % | DIASTOLIC BLOOD PRESSURE: 73 MMHG | WEIGHT: 125 LBS | SYSTOLIC BLOOD PRESSURE: 103 MMHG | HEIGHT: 64 IN | HEART RATE: 87 BPM | TEMPERATURE: 98.6 F

## 2023-04-21 DIAGNOSIS — M75.22 BICIPITAL TENDINITIS, LEFT SHOULDER: ICD-10-CM

## 2023-04-21 DIAGNOSIS — R22.30 LOCALIZED SWELLING, MASS AND LUMP, UNSPECIFIED UPPER LIMB: ICD-10-CM

## 2023-04-21 DIAGNOSIS — L98.9 DISORDER OF THE SKIN AND SUBCUTANEOUS TISSUE, UNSPECIFIED: ICD-10-CM

## 2023-04-21 DIAGNOSIS — R06.02 SHORTNESS OF BREATH: ICD-10-CM

## 2023-04-21 DIAGNOSIS — M75.52 BURSITIS OF LEFT SHOULDER: ICD-10-CM

## 2023-04-21 PROCEDURE — G0444 DEPRESSION SCREEN ANNUAL: CPT | Mod: 59

## 2023-04-21 PROCEDURE — 99205 OFFICE O/P NEW HI 60 MIN: CPT | Mod: 25

## 2023-04-21 PROCEDURE — 81002 URINALYSIS NONAUTO W/O SCOPE: CPT

## 2023-04-21 PROCEDURE — 99386 PREV VISIT NEW AGE 40-64: CPT | Mod: 25

## 2023-04-21 RX ORDER — BIOTIN 10 MG
TABLET ORAL
Refills: 0 | Status: DISCONTINUED | COMMUNITY
End: 2023-04-21

## 2023-04-21 RX ORDER — ASCORBIC ACID/MULTIVIT-MIN 1000 MG
EFFERVESCENT POWDER IN PACKET ORAL
Refills: 0 | Status: DISCONTINUED | COMMUNITY
End: 2023-04-21

## 2023-04-21 RX ORDER — DICLOFENAC SODIUM 75 MG/1
75 TABLET, DELAYED RELEASE ORAL TWICE DAILY
Qty: 60 | Refills: 1 | Status: DISCONTINUED | COMMUNITY
Start: 2018-11-19 | End: 2023-04-21

## 2023-04-21 RX ORDER — DIAZEPAM 5 MG/1
TABLET ORAL
Refills: 0 | Status: DISCONTINUED | COMMUNITY
End: 2023-04-21

## 2023-04-21 RX ORDER — IBUPROFEN 600 MG/1
600 TABLET, FILM COATED ORAL 3 TIMES DAILY
Qty: 90 | Refills: 1 | Status: DISCONTINUED | COMMUNITY
Start: 2019-10-17 | End: 2023-04-21

## 2023-04-24 ENCOUNTER — NON-APPOINTMENT (OUTPATIENT)
Age: 53
End: 2023-04-24

## 2023-04-24 ENCOUNTER — EMERGENCY (EMERGENCY)
Facility: HOSPITAL | Age: 53
LOS: 1 days | Discharge: HOME | End: 2023-04-24
Attending: EMERGENCY MEDICINE | Admitting: EMERGENCY MEDICINE
Payer: MEDICAID

## 2023-04-24 VITALS
SYSTOLIC BLOOD PRESSURE: 99 MMHG | DIASTOLIC BLOOD PRESSURE: 67 MMHG | WEIGHT: 134.92 LBS | RESPIRATION RATE: 18 BRPM | HEIGHT: 65 IN | HEART RATE: 78 BPM | OXYGEN SATURATION: 99 % | TEMPERATURE: 99 F

## 2023-04-24 LAB
ALBUMIN SERPL ELPH-MCNC: 3.7 G/DL — SIGNIFICANT CHANGE UP (ref 3.4–5)
ALP SERPL-CCNC: 65 U/L — SIGNIFICANT CHANGE UP (ref 40–120)
ALT FLD-CCNC: 23 U/L — SIGNIFICANT CHANGE UP (ref 12–42)
ANION GAP SERPL CALC-SCNC: 8 MMOL/L — LOW (ref 9–16)
AST SERPL-CCNC: 26 U/L — SIGNIFICANT CHANGE UP (ref 15–37)
BASOPHILS # BLD AUTO: 0.04 K/UL — SIGNIFICANT CHANGE UP (ref 0–0.2)
BASOPHILS NFR BLD AUTO: 0.6 % — SIGNIFICANT CHANGE UP (ref 0–2)
BILIRUB SERPL-MCNC: 0.6 MG/DL — SIGNIFICANT CHANGE UP (ref 0.2–1.2)
BUN SERPL-MCNC: 10 MG/DL — SIGNIFICANT CHANGE UP (ref 7–23)
CALCIUM SERPL-MCNC: 8.6 MG/DL — SIGNIFICANT CHANGE UP (ref 8.5–10.5)
CHLORIDE SERPL-SCNC: 107 MMOL/L — SIGNIFICANT CHANGE UP (ref 96–108)
CO2 SERPL-SCNC: 26 MMOL/L — SIGNIFICANT CHANGE UP (ref 22–31)
CREAT SERPL-MCNC: 0.92 MG/DL — SIGNIFICANT CHANGE UP (ref 0.5–1.3)
EGFR: 76 ML/MIN/1.73M2 — SIGNIFICANT CHANGE UP
EOSINOPHIL # BLD AUTO: 0.06 K/UL — SIGNIFICANT CHANGE UP (ref 0–0.5)
EOSINOPHIL NFR BLD AUTO: 0.9 % — SIGNIFICANT CHANGE UP (ref 0–6)
FLUAV AG NPH QL: SIGNIFICANT CHANGE UP
FLUBV AG NPH QL: SIGNIFICANT CHANGE UP
GLUCOSE SERPL-MCNC: 104 MG/DL — HIGH (ref 70–99)
HCT VFR BLD CALC: 41.8 % — SIGNIFICANT CHANGE UP (ref 34.5–45)
HGB BLD-MCNC: 13.8 G/DL — SIGNIFICANT CHANGE UP (ref 11.5–15.5)
IMM GRANULOCYTES NFR BLD AUTO: 0.3 % — SIGNIFICANT CHANGE UP (ref 0–0.9)
LYMPHOCYTES # BLD AUTO: 1.31 K/UL — SIGNIFICANT CHANGE UP (ref 1–3.3)
LYMPHOCYTES # BLD AUTO: 19.3 % — SIGNIFICANT CHANGE UP (ref 13–44)
MCHC RBC-ENTMCNC: 33 GM/DL — SIGNIFICANT CHANGE UP (ref 32–36)
MCHC RBC-ENTMCNC: 33.9 PG — SIGNIFICANT CHANGE UP (ref 27–34)
MCV RBC AUTO: 102.7 FL — HIGH (ref 80–100)
MONOCYTES # BLD AUTO: 0.69 K/UL — SIGNIFICANT CHANGE UP (ref 0–0.9)
MONOCYTES NFR BLD AUTO: 10.2 % — SIGNIFICANT CHANGE UP (ref 2–14)
NEUTROPHILS # BLD AUTO: 4.67 K/UL — SIGNIFICANT CHANGE UP (ref 1.8–7.4)
NEUTROPHILS NFR BLD AUTO: 68.7 % — SIGNIFICANT CHANGE UP (ref 43–77)
NRBC # BLD: 0 /100 WBCS — SIGNIFICANT CHANGE UP (ref 0–0)
PLATELET # BLD AUTO: 186 K/UL — SIGNIFICANT CHANGE UP (ref 150–400)
POTASSIUM SERPL-MCNC: 3.9 MMOL/L — SIGNIFICANT CHANGE UP (ref 3.5–5.3)
POTASSIUM SERPL-SCNC: 3.9 MMOL/L — SIGNIFICANT CHANGE UP (ref 3.5–5.3)
PROT SERPL-MCNC: 7.2 G/DL — SIGNIFICANT CHANGE UP (ref 6.4–8.2)
RBC # BLD: 4.07 M/UL — SIGNIFICANT CHANGE UP (ref 3.8–5.2)
RBC # FLD: 12.9 % — SIGNIFICANT CHANGE UP (ref 10.3–14.5)
RSV RNA NPH QL NAA+NON-PROBE: SIGNIFICANT CHANGE UP
SARS-COV-2 RNA SPEC QL NAA+PROBE: SIGNIFICANT CHANGE UP
SODIUM SERPL-SCNC: 141 MMOL/L — SIGNIFICANT CHANGE UP (ref 132–145)
WBC # BLD: 6.79 K/UL — SIGNIFICANT CHANGE UP (ref 3.8–10.5)
WBC # FLD AUTO: 6.79 K/UL — SIGNIFICANT CHANGE UP (ref 3.8–10.5)

## 2023-04-24 PROCEDURE — 71046 X-RAY EXAM CHEST 2 VIEWS: CPT | Mod: 26

## 2023-04-24 PROCEDURE — 99285 EMERGENCY DEPT VISIT HI MDM: CPT

## 2023-04-24 PROCEDURE — 73090 X-RAY EXAM OF FOREARM: CPT | Mod: 26,RT

## 2023-04-24 PROCEDURE — 93971 EXTREMITY STUDY: CPT | Mod: 26,RT

## 2023-04-24 NOTE — ED ADULT NURSE NOTE - CHIEF COMPLAINT QUOTE
pt c/o multiple medical problems; r arm pain, "lump" on knee, 'whole body swelling" that resolved, "coughing chunks" has many outpt appts set for this week including imaging and ortho tomorrow. Hx of yelling at staff, anxiety and agitation, presents calm

## 2023-04-24 NOTE — ED PROVIDER NOTE - PROGRESS NOTE DETAILS
Patient is resting comfortably, NAD. US showed complex fluid collection. RAdiologist suggested hematoma but patient denies injury and has no ecchymosis. Instructed to keep appt with orthopedist tomorrow. Return to the ED immediately if getting worse, not improving, or if having any new or troubling symptoms.

## 2023-04-24 NOTE — ED PROVIDER NOTE - CARE PLAN
1 Principal Discharge DX:	Mass of soft tissue of upper extremity  Secondary Diagnosis:	Upper respiratory infection

## 2023-04-24 NOTE — ED ADULT NURSE NOTE - ED STAT RN HANDOFF DETAILS
assumed care of patient from STEFANO Byrd and STEFANO Garza at 12/noon; patient back from [place] at 1226pm; orders in for blood work to be drawn and sent; not completed at this time; waiting for imaging results and blood work

## 2023-04-24 NOTE — ED ADULT TRIAGE NOTE - CHIEF COMPLAINT QUOTE
pt c/o multiple medical problems; r arm pain, "lump" on knee, 'whole body swelling" that resolved, "coughing chunks" has many outpt appts set for this week including imaging and ortho tomorrow. Hx of yelling at staff, anxiety and agitiation, presents calm pt c/o multiple medical problems; r arm pain, "lump" on knee, 'whole body swelling" that resolved, "coughing chunks" has many outpt appts set for this week including imaging and ortho tomorrow. Hx of yelling at staff, anxiety and agitation, presents calm

## 2023-04-24 NOTE — ED PROVIDER NOTE - NSFOLLOWUPINSTRUCTIONS_ED_ALL_ED_FT
You were seen today for a painful lump on your right arm. The ultrasound showed a fluid collection. It is not yet clear what is causing this problem. Keep your scheduled appointment with the orthopedic doctor and all of your other outpatient appointments.    Return to the ED for:    - fever  - redness or warmth to the arm  - tingling or numbness in your fingers  - any new or concerning symptoms.     Viral Respiratory Infection    A viral respiratory infection is an illness that affects parts of the body used for breathing, like the lungs, nose, and throat. It is caused by a germ called a virus. Symptoms can include runny nose, coughing, sneezing, fatigue, body aches, sore throat, fever, or headache. Over the counter medicine can be used to manage the symptoms but the infection typically goes away on its own in 5 to 10 days.     SEEK IMMEDIATE MEDICAL CARE IF YOU HAVE ANY OF THE FOLLOWING SYMPTOMS: shortness of breath, chest pain, fever over 10 days, or lightheadedness/dizziness.

## 2023-04-24 NOTE — ED ADULT NURSE NOTE - OBJECTIVE STATEMENT
Pt is a 52y female presenting with multiple complaints. Pt states she is here for mainly for her right forearm which has a "lump" starting last Wednesday. and increased pain radiating up and dorm her arm. Pt also c/o of sore throat and coughing up "yellow chunks". Pt also c/o pulling a muscle behind her right leg and inability "to do squats". Pt states she went to  and her PCP provider for these issues last week and has imaging scheduled for them this week.

## 2023-04-24 NOTE — ED PROVIDER NOTE - PHYSICAL EXAMINATION
Gen: Well-developed, well-nourished, NAD, VS as noted by nursing. HEENT: NCAT, mmm   Chest: RRR, nl S1 and S2, no m/r/g. Resp: CTAB, no w/r/r  Abd: nl BS, soft, nt/nd. Ext: Warm, dry. Right forearm - palpable, tender, deep soft tissue mass dorsal aspect, 2x3cm. firm, rubbery, freely moveable. no overlying skin changes.   Neuro: CN II-XII intact, normal and equal strength, sensation, and reflexes bilaterally, normal gait  Psych: AAOx3

## 2023-04-24 NOTE — ED PROVIDER NOTE - OBJECTIVE STATEMENT
Patient reports she has noticed a painful lump to her right forearm for 1 week, gradually worsening. Also has intermittent pain and swelling behind right knee for 1 week. Also has sore throat and cough productive of yellow sputum for 2 days. Also had severe swelling of both legs yesterday and the day before, which resolved spontaneously. No fever, cp, sob, ap, n/v/d, injury. Has appt with orthopedist for the right forearm swelling tomorrow. Came in to ED today because forearm pain and swelling is worse.

## 2023-04-24 NOTE — ED PROVIDER NOTE - NSICDXPASTMEDICALHX_GEN_ALL_CORE_FT
ID Progress Note    Jyoti Garcia is a 63 year old female with sepsis    Today's reason for visit: e-coli sepsis    S: stable, no new issue, no pain, no fever, no cough, no shortness of breath     ROS:    General: no fever, no pain  HEENT: no visual problems, no hearing issues, no headache, no throat pain  Neck: no swelling, or pain  Chest: no coughing, no shortness of breath  CVS: no chest pain, no palpitation  GI: no nausea, vomiting, no diarrhea, no abd pain  : no urinary frequency, no dysuria, no discharge, no blood in urine  Ext: no edema , no wound or ulcers  Skin: no rash  Musculoskeletal: no joint pain, no difficulty moving limbs  Neuro: no weakness, no tremors, no headache  Psych: no anxiety, no depression        O:        Vital Last Value 24 Hour Range   Temperature 97.8 °F (36.6 °C) (09/02/22 0811) Temp  Min: 97.1 °F (36.2 °C)  Max: 98.6 °F (37 °C)   Pulse 80 (09/02/22 0811) Pulse  Min: 74  Max: 95   Respiratory 17 (09/02/22 0811) Resp  Min: 17  Max: 23   Non-Invasive  Blood Pressure 111/73 (09/02/22 0811) BP  Min: 99/58  Max: 141/76   Pulse Oximetry 99 % (09/02/22 0811) SpO2  Min: 93 %  Max: 100 %   Arterial   Blood Pressure   No data recorded     O2 No data recorded       Vital Today Admit   Weight 55.6 kg (122 lb 9.2 oz) (09/02/22 0448) Weight: 42.3 kg (93 lb 4.1 oz) (08/23/22 1953)   Height N/A Height: 5' 6\" (167.6 cm) (08/23/22 1953)   BMI N/A BMI (Calculated): 15.05 (08/23/22 1953)         I/O last 3 completed shifts:  In: 490 [I.V.:390; IV Piggyback:100]  Out: 780 [Urine:350; Stool:430]      Intake/Output Summary (Last 24 hours) at 9/2/2022 0859  Last data filed at 9/2/2022 0619  Gross per 24 hour   Intake 490 ml   Output 780 ml   Net -290 ml        EXAM:  GEN:  Stable, not in acute distress  HEENT: no icterus, no conjunctival hemorrhages  NECK: supple, no JVD, no thyromegaly, no bruit  CVS: s1s2 audible, regular  CHEST: clear, decreased  breath sounds at bases, no wheezing, no rales  ABD: soft, non tender  EXT: no edema, no ulcer  NEURO: grossly nonfocal  SKIN: no rash, no ulcer  LYMP: no cervical  lymphadenopathy  PSYCH: stable, not anxious or depressed  Lines:     LABS:     Reviewed:  Recent Labs   Lab 09/02/22  0456 09/01/22  1333 09/01/22  0511 08/31/22  0503 08/30/22  1915 08/30/22  1747 08/30/22  0450 08/29/22  0458 08/28/22  0443   HGB 10.1*  --  8.6* 9.6* 10.8*  --  10.1* 8.7* 8.9*   WBC 9.5  --  7.7 15.9* 12.0*  --  9.9 7.0 7.2   SEG 63  --  60 80 63  --  60 57 58     --  377 379 386  --  391 354 334   SODIUM 139  --  141 140  --  142 144 134* 138   POTASSIUM 4.2 3.8 2.9* 3.2*  --  3.8 3.2* 3.9 4.1   CHLORIDE 106  --  105 103  --  107 107 109 109   BUN 22*  --  28* 33*  --  35* 38* 45* 53*   CREATININE 2.06*  --  2.29* 2.37*  --  2.39* 2.45* 2.69* 3.08*   BILIRUBIN  --   --   --   --   --  0.2  --   --   --    GPT  --   --   --   --   --  19  --   --   --    AST  --   --   --   --   --  35  --   --   --    ALKPT  --   --   --   --   --  122*  --   --   --             CULTURES:  Reviewed:   Lab Results   Component Value Date    BLC No Growth 5 Days. 08/23/2022    BLC No Growth 5 Days. 08/23/2022    BLC No Growth 5 Days. 01/06/2022    UC >100,000 CFU/mL Escherichia coli (A) 08/23/2022    UC  03/09/2022     >100,000 CFU/mL, Multiple organisms isolated with no predominant type, consistent with contamination. Consider recollection.    AANC No Growth 5 Days. 01/28/2022    AANC No Growth 4 Days. 08/27/2021    AANC No Growth 4 Days. 08/26/2021    GS Moderate Polymorphonuclear cells. 01/28/2022    GS No epithelial cells seen. 01/28/2022    GS No organisms seen. 01/28/2022    FNCS No Growth 28 Days. 08/26/2021    FUSM No fungal elements seen. 08/26/2021      Other:      IMAGING:    Reviewed:   XR:   CT Scan:  US  Other:     MEDS:  Current Facility-Administered Medications   Medication Dose Route Frequency Provider Last Rate Last Admin   •  sucralfate (CARAFATE) 1 GM/10ML suspension 1 g  1 g Oral BID AC Corey To MD   1 g at 09/02/22 0554   • potassium CHLORIDE (KLOR-CON) packet 20 mEq  20 mEq Oral Daily with breakfast Corey To MD   20 mEq at 09/02/22 0823   • pantoprazole (PROTONIX INJECT) injection 40 mg  40 mg Intravenous 2 times per day GERDA Ramirez   40 mg at 09/02/22 0823   • midodrine (PROAMATINE) tablet 5 mg  5 mg Per NG tube TID Corey To MD   5 mg at 09/02/22 0824   • pravastatin (PRAVACHOL) tablet 10 mg  10 mg Per NG tube Nightly Corey To MD   10 mg at 09/01/22 2052   • sertraline (ZOLOFT) tablet 50 mg  50 mg Per NG tube Daily Corey To MD   50 mg at 09/02/22 0824   • ondansetron (ZOFRAN) injection 4 mg  4 mg Intravenous Q6H GERDA Ramirez   4 mg at 09/02/22 0823   • insulin glargine (LANTUS) injection 18 Units  18 Units Subcutaneous Nightly Na Su MD   18 Units at 08/30/22 2200   • meropenem (MERREM) 500 mg in sodium chloride 0.9 % 100 mL IVPB  500 mg Intravenous 2 times per day Na Su  mL/hr at 09/02/22 0824 500 mg at 09/02/22 0824   • [Held by provider] heparin (porcine) injection 5,000 Units  5,000 Units Subcutaneous BID Corey To MD   5,000 Units at 08/31/22 0924   • Potassium Standard Replacement Protocol (Levels 3.5 and lower)   Does not apply See Admin Instructions Corey To MD       • Magnesium Standard Replacement Protocol   Does not apply See Admin Instructions Corey To MD       • insulin lispro (ADMELOG,HumaLOG) - Correction Dose   Subcutaneous TID WC Corey To MD   4 Units at 08/29/22 0824   • sodium chloride (PF) 0.9 % injection 2 mL  2 mL Intracatheter 2 times per day Janina Caldera MD   2 mL at 09/02/22 0833        ASSESSMENT:    UTI. E-coli  Sepsis  PCN allergy  Jejunal mass, endo [facility]       PLAN:  Cefepime, changed to meropenem  PO soon, Macrobid is only PO option due to MDR and PCN allergy so will do iv ertapenem  10 days       Discharge  criteria and plan:  IV ertapenem on dc (started 8/25; EOT 9/4)    Follow up as outpatient on discharge in 2-3 weeks    Discussed with: Patient, RN,     Will follow,    Cyndi Lr MD    Please dial [phone] for answering service   PAST MEDICAL HISTORY:  ADHD     Migraine

## 2023-04-24 NOTE — ED PROVIDER NOTE - CLINICAL SUMMARY MEDICAL DECISION MAKING FREE TEXT BOX
Patient with tender mass to right forearm, does not feel like abscess. will get labs, RUE XR, RUE US. Also with productive cough, will check CXR.

## 2023-04-24 NOTE — ED PROVIDER NOTE - PATIENT PORTAL LINK FT
You can access the FollowMyHealth Patient Portal offered by [facility] by registering at the following website: [email or web address]. By joining [facility]’s FollowMyHealth portal, you will also be able to view your health information using other applications (apps) compatible with our system.

## 2023-04-25 ENCOUNTER — APPOINTMENT (OUTPATIENT)
Dept: ORTHOPEDIC SURGERY | Facility: CLINIC | Age: 53
End: 2023-04-25
Payer: MEDICAID

## 2023-04-25 VITALS — BODY MASS INDEX: 23.56 KG/M2 | WEIGHT: 138 LBS | HEIGHT: 64 IN

## 2023-04-25 PROBLEM — F90.9 ATTENTION-DEFICIT HYPERACTIVITY DISORDER, UNSPECIFIED TYPE: Chronic | Status: ACTIVE | Noted: 2023-04-24

## 2023-04-25 LAB
25(OH)D3 SERPL-MCNC: 32 NG/ML
ALBUMIN SERPL ELPH-MCNC: 4.4 G/DL
ALP BLD-CCNC: 57 U/L
ALT SERPL-CCNC: 16 U/L
ANION GAP SERPL CALC-SCNC: 11 MMOL/L
AST SERPL-CCNC: 22 U/L
BASOPHILS # BLD AUTO: 0.05 K/UL
BASOPHILS NFR BLD AUTO: 0.7 %
BILIRUB SERPL-MCNC: 0.7 MG/DL
BUN SERPL-MCNC: 12 MG/DL
CALCIUM SERPL-MCNC: 9.5 MG/DL
CHLORIDE SERPL-SCNC: 108 MMOL/L
CHOLEST SERPL-MCNC: 200 MG/DL
CO2 SERPL-SCNC: 20 MMOL/L
CREAT SERPL-MCNC: 0.81 MG/DL
EGFR: 88 ML/MIN/1.73M2
EOSINOPHIL # BLD AUTO: 0.05 K/UL
EOSINOPHIL NFR BLD AUTO: 0.7 %
ESTIMATED AVERAGE GLUCOSE: 114 MG/DL
GLUCOSE SERPL-MCNC: 95 MG/DL
HBA1C MFR BLD HPLC: 5.6 %
HCT VFR BLD CALC: 44.8 %
HCV AB SER QL: NONREACTIVE
HCV S/CO RATIO: 0.15 S/CO
HDLC SERPL-MCNC: 74 MG/DL
HGB BLD-MCNC: 14.2 G/DL
HIV1+2 AB SPEC QL IA.RAPID: NONREACTIVE
IMM GRANULOCYTES NFR BLD AUTO: 0.1 %
LDLC SERPL CALC-MCNC: 108 MG/DL
LYMPHOCYTES # BLD AUTO: 1.71 K/UL
LYMPHOCYTES NFR BLD AUTO: 25 %
MAN DIFF?: NORMAL
MCHC RBC-ENTMCNC: 31.7 GM/DL
MCHC RBC-ENTMCNC: 33.3 PG
MCV RBC AUTO: 105.2 FL
MONOCYTES # BLD AUTO: 0.59 K/UL
MONOCYTES NFR BLD AUTO: 8.6 %
NEUTROPHILS # BLD AUTO: 4.43 K/UL
NEUTROPHILS NFR BLD AUTO: 64.9 %
NONHDLC SERPL-MCNC: 126 MG/DL
PLATELET # BLD AUTO: 189 K/UL
POTASSIUM SERPL-SCNC: 4.4 MMOL/L
PROT SERPL-MCNC: 6.6 G/DL
RBC # BLD: 4.26 M/UL
RBC # FLD: 13.9 %
SODIUM SERPL-SCNC: 139 MMOL/L
T PALLIDUM AB SER QL IA: NEGATIVE
TRIGL SERPL-MCNC: 91 MG/DL
TSH SERPL-ACNC: 2.36 UIU/ML
UREA BREATH TEST QL: NEGATIVE
WBC # FLD AUTO: 6.84 K/UL

## 2023-04-25 PROCEDURE — 99203 OFFICE O/P NEW LOW 30 MIN: CPT

## 2023-04-25 PROCEDURE — 73562 X-RAY EXAM OF KNEE 3: CPT | Mod: RT

## 2023-04-25 RX ORDER — NABUMETONE 500 MG/1
500 TABLET, FILM COATED ORAL
Qty: 60 | Refills: 0 | Status: ACTIVE | COMMUNITY
Start: 2023-04-25 | End: 1900-01-01

## 2023-04-25 NOTE — PHYSICAL EXAM
[de-identified] : Bilateral knee\par \par Constitutional: \par The patient is healthy-appearing and in no apparent distress. \par \par Gait:\par The patient ambulates with a normal gait and no limp.\par \par Cardiovascular System: \par The capillary refill is less than 2 seconds. \par \par Skin: \par There are no skin abnormalities.\par \par Bilateral Knee: \par \par Bony Palpation: \par There is no tenderness of the medial joint line. \par There is no tenderness of the lateral joint line.\par There is no tenderness of the medial femoral chondyle.\par There is no tenderness of the lateral femoral chondyle.\par There is no tenderness of the tibial tubercle.\par There is no tenderness of the superior patella.\par There is no tenderness of the inferior patella.\par There is tenderness of the medial patellar facet.\par There is tenderness of the lateral patellar facet.\par \par Soft Tissue Palpation: \par There is no tenderness of the medial retinaculum.\par There is no tenderness of the lateral retinaculum.\par There is no tenderness of the quadriceps tendon.\par There is no tenderness of the patella tendon.\par There is no tenderness of the ITB.\par There is no tenderness of the pes anserine.\par \par Active Range of Motion: \par The range of motion at the knee actively and passively is full. \par \par Special Tests: \par There is a negative Apley.\par There is a negative Steinmanns. \par There is a negative Lachman and Anterior Drawer.\par There is a negative Posterior Drawer.  \par There is no varus or valgus laxity.\par \par Strength: \par There is 4/5 hip flexion and 5/5 knee flexion and extension.  \par \par Psychiatric: \par The patient demonstrates a normal mood and affect and is active and alert.\par Right forearm\par \par Cardiovascular System: \par There is capillary refill less than 2 seconds. \par \par Skin: \par There is dorsal mid mid-forearm fullness c/w probable hematoma\par \par Right Forearm: \par Appearance: \par There is no deformity, induration, redness, swelling, or warmth and a normal carrying angle. \par \par Bony Palpation: \par There is no tenderness of the medial epicondyle.\par There is no tenderness of olecranon.\par There is no tenderness of the ulnatrochlea articulation.\par There is no tenderness of the coronoid process.\par There is no tenderness of the radial head.\par There is no tenderness of the radiocapitellar joint.\par There is no tenderness of the lateral epicondyle. \par \par Soft Tissue Palpation: \par There is no tenderness of the ulnar nerve.\par There is no tenderness of the biceps insertion.\par There is no tenderness of the pronator teres.\par There is no tenderness of the flexor carpi ulnaris.\par There is no tenderness of the flexor carpi radialis.\par There is no tenderness of the annular ligament of the radius.\par There is no tenderness of the brachioradialis.\par There is no tenderness of the radial collateral ligament.\par There is no tenderness of the ulnar collateral ligament.\par There is no tenderness of the antecubital fossa.\par There is no tenderness of the extensor carpi radialis brevis.\par There is no tenderness of the extensor carpi radialis longus.\par \par Range of Motion:  \par There is full range of motion both actively and passively. \par  \par Strength: \par There is 5/5 elbow flexion, extension, supination and pronation.  \par \par Neurologic:\par There is normal sensation C5-T1 to light touch. \par  [de-identified] : Given patient's reported history and physical examination, x-ray evaluation ( as listed below ) was ordered and performed to aid in diagnosis and treatment of the patient.\par X-ray right knee.  There is mild patellofemoral and medial compartment arthritis

## 2023-04-25 NOTE — HISTORY OF PRESENT ILLNESS
[de-identified] : location: right forearm and right knee\par duration: 1 week \par context: lump from unknown cause behind right knee and on right forearm\par quality: swelling; warmth to right forearm\par aggravating factors: bending knee\par associated sx: N/A\par conservative tx: N/A\par prior studies: US and XR RUE in [facility] ED 4/24/23

## 2023-04-25 NOTE — ASSESSMENT
[FreeTextEntry1] : Discussed at length with patient exam history and imaging at this time recommendation for MRI evaluation performed although this is most likely consistent with a hematoma given her lack of definitive reported trauma we will obtain an MRI for further evaluation.  In regards to bilateral knees discussed underlying arthritis and avoidance of hyperflexion in the past 90°.  Patient became rather confused state of health and she not bend her knee past 90 and I really discussed at length with her I can see she can't bend her knee past 90 she is able to do yoga activities but when she is sitting at home or otherwise not fully engaged in the particular activity it is recommended to avoid hyperflexion of the knee past 90°

## 2023-04-26 ENCOUNTER — APPOINTMENT (OUTPATIENT)
Dept: ULTRASOUND IMAGING | Facility: CLINIC | Age: 53
End: 2023-04-26
Payer: MEDICAID

## 2023-04-26 ENCOUNTER — APPOINTMENT (OUTPATIENT)
Dept: RADIOLOGY | Facility: CLINIC | Age: 53
End: 2023-04-26
Payer: MEDICAID

## 2023-04-26 ENCOUNTER — RESULT REVIEW (OUTPATIENT)
Age: 53
End: 2023-04-26

## 2023-04-26 ENCOUNTER — OUTPATIENT (OUTPATIENT)
Dept: OUTPATIENT SERVICES | Facility: HOSPITAL | Age: 53
LOS: 1 days | End: 2023-04-26

## 2023-04-26 ENCOUNTER — APPOINTMENT (OUTPATIENT)
Dept: MAMMOGRAPHY | Facility: CLINIC | Age: 53
End: 2023-04-26
Payer: MEDICAID

## 2023-04-26 DIAGNOSIS — Z20.822 CONTACT WITH AND (SUSPECTED) EXPOSURE TO COVID-19: ICD-10-CM

## 2023-04-26 DIAGNOSIS — F90.9 ATTENTION-DEFICIT HYPERACTIVITY DISORDER, UNSPECIFIED TYPE: ICD-10-CM

## 2023-04-26 DIAGNOSIS — R22.31 LOCALIZED SWELLING, MASS AND LUMP, RIGHT UPPER LIMB: ICD-10-CM

## 2023-04-26 DIAGNOSIS — J06.9 ACUTE UPPER RESPIRATORY INFECTION, UNSPECIFIED: ICD-10-CM

## 2023-04-26 DIAGNOSIS — Z91.018 ALLERGY TO OTHER FOODS: ICD-10-CM

## 2023-04-26 DIAGNOSIS — J02.9 ACUTE PHARYNGITIS, UNSPECIFIED: ICD-10-CM

## 2023-04-26 DIAGNOSIS — G43.909 MIGRAINE, UNSPECIFIED, NOT INTRACTABLE, WITHOUT STATUS MIGRAINOSUS: ICD-10-CM

## 2023-04-26 PROCEDURE — 76882 US LMTD JT/FCL EVL NVASC XTR: CPT | Mod: 26,RT

## 2023-04-26 PROCEDURE — 71046 X-RAY EXAM CHEST 2 VIEWS: CPT | Mod: 26

## 2023-04-26 PROCEDURE — 76641 ULTRASOUND BREAST COMPLETE: CPT | Mod: 26,50

## 2023-04-26 PROCEDURE — 77066 DX MAMMO INCL CAD BI: CPT | Mod: 26

## 2023-04-26 PROCEDURE — 76700 US EXAM ABDOM COMPLETE: CPT | Mod: 26

## 2023-04-26 PROCEDURE — 77062 BREAST TOMOSYNTHESIS BI: CPT | Mod: 26

## 2023-04-27 DIAGNOSIS — R92.8 OTHER ABNORMAL AND INCONCLUSIVE FINDINGS ON DIAGNOSTIC IMAGING OF BREAST: ICD-10-CM

## 2023-04-28 ENCOUNTER — NON-APPOINTMENT (OUTPATIENT)
Age: 53
End: 2023-04-28

## 2023-05-01 ENCOUNTER — APPOINTMENT (OUTPATIENT)
Dept: FAMILY MEDICINE | Facility: CLINIC | Age: 53
End: 2023-05-01
Payer: MEDICAID

## 2023-05-01 VITALS
WEIGHT: 141 LBS | TEMPERATURE: 98.5 F | BODY MASS INDEX: 24.07 KG/M2 | SYSTOLIC BLOOD PRESSURE: 109 MMHG | HEIGHT: 64 IN | HEART RATE: 81 BPM | DIASTOLIC BLOOD PRESSURE: 74 MMHG | OXYGEN SATURATION: 96 %

## 2023-05-01 PROCEDURE — 99214 OFFICE O/P EST MOD 30 MIN: CPT | Mod: 25

## 2023-05-02 LAB
ENDOMYSIUM IGA SER QL: NEGATIVE
ENDOMYSIUM IGA TITR SER: NORMAL
TTG IGA SER IA-ACNC: <1.2 U/ML
TTG IGA SER-ACNC: NEGATIVE

## 2023-05-03 ENCOUNTER — TRANSCRIPTION ENCOUNTER (OUTPATIENT)
Age: 53
End: 2023-05-03

## 2023-05-03 LAB — UREA BREATH TEST QL: NEGATIVE

## 2023-05-06 ENCOUNTER — OUTPATIENT (OUTPATIENT)
Dept: OUTPATIENT SERVICES | Facility: HOSPITAL | Age: 53
LOS: 1 days | End: 2023-05-06

## 2023-05-06 ENCOUNTER — APPOINTMENT (OUTPATIENT)
Dept: MRI IMAGING | Facility: CLINIC | Age: 53
End: 2023-05-06
Payer: MEDICAID

## 2023-05-06 PROCEDURE — 77049 MRI BREAST C-+ W/CAD BI: CPT | Mod: 26

## 2023-05-08 ENCOUNTER — NON-APPOINTMENT (OUTPATIENT)
Age: 53
End: 2023-05-08

## 2023-05-09 ENCOUNTER — OUTPATIENT (OUTPATIENT)
Dept: OUTPATIENT SERVICES | Facility: HOSPITAL | Age: 53
LOS: 1 days | End: 2023-05-09

## 2023-05-09 ENCOUNTER — RESULT REVIEW (OUTPATIENT)
Age: 53
End: 2023-05-09

## 2023-05-09 ENCOUNTER — APPOINTMENT (OUTPATIENT)
Dept: MRI IMAGING | Facility: CLINIC | Age: 53
End: 2023-05-09
Payer: MEDICAID

## 2023-05-09 PROCEDURE — 73225 MR ANGIO UPR EXTR W/O&W/DYE: CPT | Mod: 26,RT

## 2023-05-10 ENCOUNTER — NON-APPOINTMENT (OUTPATIENT)
Age: 53
End: 2023-05-10

## 2023-05-15 ENCOUNTER — TRANSCRIPTION ENCOUNTER (OUTPATIENT)
Age: 53
End: 2023-05-15

## 2023-05-15 ENCOUNTER — APPOINTMENT (OUTPATIENT)
Dept: GASTROENTEROLOGY | Facility: CLINIC | Age: 53
End: 2023-05-15
Payer: MEDICAID

## 2023-05-15 VITALS
HEART RATE: 82 BPM | WEIGHT: 141 LBS | SYSTOLIC BLOOD PRESSURE: 110 MMHG | TEMPERATURE: 97 F | DIASTOLIC BLOOD PRESSURE: 65 MMHG | OXYGEN SATURATION: 98 % | RESPIRATION RATE: 15 BRPM | HEIGHT: 64 IN | BODY MASS INDEX: 24.07 KG/M2

## 2023-05-15 DIAGNOSIS — R14.0 ABDOMINAL DISTENSION (GASEOUS): ICD-10-CM

## 2023-05-15 DIAGNOSIS — Z12.11 ENCOUNTER FOR SCREENING FOR MALIGNANT NEOPLASM OF COLON: ICD-10-CM

## 2023-05-15 DIAGNOSIS — R19.5 OTHER FECAL ABNORMALITIES: ICD-10-CM

## 2023-05-15 PROCEDURE — 99204 OFFICE O/P NEW MOD 45 MIN: CPT

## 2023-05-17 PROBLEM — R19.5 LOOSE STOOLS: Status: ACTIVE | Noted: 2023-05-15

## 2023-05-17 PROBLEM — Z12.11 COLON CANCER SCREENING: Status: ACTIVE | Noted: 2023-05-17

## 2023-05-17 PROBLEM — R14.0 ABDOMINAL DISTENTION: Status: ACTIVE | Noted: 2023-04-21

## 2023-05-17 NOTE — HISTORY OF PRESENT ILLNESS
[FreeTextEntry1] : 52F with ADD referred by Dr. Fermin for abdominal bloating. \par \par HPI- Pt reports that since the Fall, gained 20 pounds and began to have abdominal bloating which is painful. Notices RUQ pain in addition to the general abdominal bloating. \par cut out cheese for couple months and no change in symptoms\par reports does have some dizziness and nausea\par if gets migraine then occasionally will throw up\par notices has had loose stools for couple weeks but denies blood in stool \par currently experiencing a lot of stress as going through divorce and psychotherapist passed away so has been off medications \par \par [place] stool score- bristol 6 \par Colon cancer screening-  never\par \par PMHx- pre-eclampsia with child, ADD \par PSHx- gallbladder removed \par Rx- no medications \par Supplements/herbs/OTC- vitamins sometimes \par A/c or NSAIDs? none \par FHx- aunt  colon cancer, mother breast cancer \par Allergies- wheat \par ETOH- 2 drinks per week \par Smoking- none \par Drugs- none \par \par Labs/stool tests- celiac negative \par Breath tests- h. pylori negative \par Imaging- [place] April 2023 wnl \par EGD- never \par Colonoscopy- 20 yrs ago

## 2023-05-17 NOTE — PHYSICAL EXAM
[Bowel Sounds] : normal bowel sounds [Abdomen Tenderness] : non-tender [Abnormal Walk] : normal gait [Normal Color / Pigmentation] : normal skin color and pigmentation [No Focal Deficits] : no focal deficits [Normal] : oriented to person, place, and time [Oriented To Time, Place, And Person] : oriented to person, place, and time [Normal Affect] : the affect was normal

## 2023-05-17 NOTE — ASSESSMENT
[FreeTextEntry1] : 52F with ADD referred by Dr. Fermin for abdominal bloating. \par \par Abdominal bloating with loose stools \par - most likely cause is stress however will evaluate for possible GI causes such as inflammation, nutritional deficiencies, parasites, and infection with labs/stool samples \par - schedule patient for colonoscopy as last one was 20 years ago and is at screening age, r/a/i/b discussed and patient agreeable, bowel prep provided \par - cmp/cbc in chart \par - gut-brain modulation strongly encouraged \par \par f/u after above

## 2023-05-18 ENCOUNTER — TRANSCRIPTION ENCOUNTER (OUTPATIENT)
Age: 53
End: 2023-05-18

## 2023-05-18 LAB
DEPRECATED O AND P PREP STL: NORMAL
GI PCR PANEL: NOT DETECTED

## 2023-05-19 ENCOUNTER — TRANSCRIPTION ENCOUNTER (OUTPATIENT)
Age: 53
End: 2023-05-19

## 2023-05-22 ENCOUNTER — APPOINTMENT (OUTPATIENT)
Dept: SURGERY | Facility: CLINIC | Age: 53
End: 2023-05-22
Payer: MEDICAID

## 2023-05-22 VITALS
WEIGHT: 136 LBS | DIASTOLIC BLOOD PRESSURE: 75 MMHG | HEART RATE: 77 BPM | BODY MASS INDEX: 23.22 KG/M2 | HEIGHT: 64 IN | SYSTOLIC BLOOD PRESSURE: 102 MMHG | TEMPERATURE: 97.9 F | OXYGEN SATURATION: 97 %

## 2023-05-22 DIAGNOSIS — Z80.8 FAMILY HISTORY OF MALIGNANT NEOPLASM OF OTHER ORGANS OR SYSTEMS: ICD-10-CM

## 2023-05-22 DIAGNOSIS — Z82.62 FAMILY HISTORY OF OSTEOPOROSIS: ICD-10-CM

## 2023-05-22 DIAGNOSIS — Z87.39 PERSONAL HISTORY OF OTHER DISEASES OF THE MUSCULOSKELETAL SYSTEM AND CONNECTIVE TISSUE: ICD-10-CM

## 2023-05-22 DIAGNOSIS — R22.31 LOCALIZED SWELLING, MASS AND LUMP, RIGHT UPPER LIMB: ICD-10-CM

## 2023-05-22 DIAGNOSIS — Z82.61 FAMILY HISTORY OF ARTHRITIS: ICD-10-CM

## 2023-05-22 DIAGNOSIS — Z78.9 OTHER SPECIFIED HEALTH STATUS: ICD-10-CM

## 2023-05-22 PROCEDURE — 99203 OFFICE O/P NEW LOW 30 MIN: CPT

## 2023-05-22 NOTE — HISTORY OF PRESENT ILLNESS
[de-identified] : Ms. Ze Womack presented today for evaluation and management of a mass of the right forearm.  She stated the mass has been present for approximately 6 weeks.  She stated it was initially swollen and sore to the touch, but has "gone down now".  She stated she can still feel the area because it remains sore, but the mass is not as palpable.  She stated the pain from the mass can radiate up the arm into the shoulder or down the forearm into the fingers.

## 2023-05-22 NOTE — PHYSICAL EXAM
[Calm] : calm [de-identified] : NAD, comfortable [de-identified] : NCAT, no scleral icterus [de-identified] : Warm, dry.  Right forearm without any significant mass appreciated. [de-identified] : No clubbing, cyanosis, or edema.   [de-identified] : A&Ox3

## 2023-05-22 NOTE — ASSESSMENT
[FreeTextEntry1] : Ms. Ze Womack is a 52-year-old woman with pain of the right forearm without a discrete mass appreciated on examination or imaging.  There is no indication for surgical intervention at this time, and she will follow up with me as needed if the mass returns.

## 2023-05-22 NOTE — CONSULT LETTER
[FreeTextEntry1] : May 22, 2023\par \par \par \par \par Scooter Fermin D.O.\par [facility]\par [address], [place] [address]\par Telephone #: [phone]\par \par \par Re: Abigail Tipton\par : 1970\par \par \par Dear Dr. Fermin:\par \par I had the opportunity to see Ms. Ze Womack today for evaluation and management of a mass of the right forearm.  She stated the mass has been present for approximately 6 weeks.  She stated it was initially swollen and sore to the touch, but has "gone down now".  She stated she can still feel the area because it remains sore, but the mass is not as palpable.  She stated the pain from the mass can radiate up the arm into the shoulder or down the forearm into the fingers.\par \par On physical examination, her height is 5 feet 6 inches, her weight is 136 pounds, and her BMI is 23.34.  Her temperature is 97.9 °F, blood pressure is 102/75, heart rate is 77, and O2 saturation is 97% on room air. In general, she is a well-dressed, well-nourished woman who appears her stated age and is in no acute distress. She is calm, alert and oriented x 3. HEENT exam demonstrates no scleral icterus and a normocephalic atraumatic appearance.  Her extremities are warm and dry without clubbing, cyanosis or edema.  Her right forearm does not have any significant mass appreciated.\par \par I reviewed the report of the ultrasound of the right arm that was performed on 2023, which demonstrated an indeterminate hypoechoic lesion within the superficial aspect of the musculature within the radial aspect of the forearm.  The lesion demonstrates mild internal vascularity and measures approximately 1.8 x 0.7 x 1.7 cm.  Differential considerations include nerve sheath tumor, among other potential etiologies.  Further evaluation with contrast-enhanced MRI of the forearm is recommended for more definitive characterization.\par \par I reviewed the report of the MRI right forearm that was performed on May 5, 2023, which demonstrated a small thin platelike region of signal hyperintensity along the surface of the muscle fascia plane at the outlined area of concern at the volar and radial aspect of the mid forearm.  Findings are indeterminate and differential includes subcutaneous edema.  Neoplasm is not excluded.  Follow up as clinically indicated.\par \par I reviewed the report of the MR angio right upper extremity that was performed on May 9, 2023, which demonstrated no suspicious solid or cystic finding to correspond to region of clinical concern along the volar/radial aspect of the forearm, with minimal possible fat necrosis within the underlying subcutaneous fat.\par \par In summary, Ms. Ze Womack is a 52-year-old woman with pain of the right forearm without a discrete mass appreciated on examination or imaging.  There is no indication for surgical intervention at this time, and she will follow up with me as needed if the mass returns.\par \par Thank you for the opportunity to care for this patient. Please do not hesitate to contact me in the event that you have any questions or concerns regarding the care of this patient. \par \par Sincerely,\par \par \par \par \par Dalia Rm M.D.

## 2023-05-22 NOTE — REASON FOR VISIT
[Consultation] : a consultation visit [FreeTextEntry1] : Consultation requested by: Dr. Scooter Fermin

## 2023-05-22 NOTE — DATA REVIEWED
[FreeTextEntry1] : US right arm (4/26/2023) - indeterminate hypoechoic lesion within the superficial aspect of the musculature within the radial aspect of the forearm.  The lesion demonstrates mild internal vascularity and measures approximately 1.8 x 0.7 x 1.7 cm.  Differential considerations include nerve sheath tumor, among other potential etiologies.  Further evaluation with contrast-enhanced MRI of the forearm is recommended for more definitive characterization.\par \par MRI right forearm without contrast (5/5/2023) - small thin platelike region of signal hyperintensity along the surface of the muscle fascia plane at the outlined area of concern at the volar and radial aspect of the mid forearm.  Findings are indeterminate and differential includes subcutaneous edema.  Neoplasm is not excluded.  Follow up as clinically indicated.\par \par MR angio right upper extremity (5/9/2023) - no suspicious solid or cystic finding to correspond to region of clinical concern along the volar/radial aspect of the forearm, with minimal possible fat necrosis within the underlying subcutaneous fat.

## 2023-05-25 ENCOUNTER — APPOINTMENT (OUTPATIENT)
Dept: BREAST CENTER | Facility: CLINIC | Age: 53
End: 2023-05-25
Payer: MEDICAID

## 2023-05-25 VITALS
DIASTOLIC BLOOD PRESSURE: 71 MMHG | HEART RATE: 62 BPM | BODY MASS INDEX: 23.22 KG/M2 | RESPIRATION RATE: 18 BRPM | SYSTOLIC BLOOD PRESSURE: 105 MMHG | HEIGHT: 64 IN | WEIGHT: 136 LBS | OXYGEN SATURATION: 100 %

## 2023-05-25 PROCEDURE — 99214 OFFICE O/P EST MOD 30 MIN: CPT

## 2023-05-25 NOTE — HISTORY OF PRESENT ILLNESS
[FreeTextEntry1] : 51 yo female referred by Dr. Fermin, presents for bilateral palpable breast lumps and a family history of breast cancer. The patient states that she has been able to feel the masses for the past 1-2 years, unsure if they have changed in size. The lumps are associated with tenderness. Pt denies skin lesions/changes, nipple discharge.  Recent breast imaging returned as benign as seen below.\par \par MELINA lifetime risk is 41.2% the patients mother was diagnosed with breast cancer at age 80; the patients maternal aunt had breast cancer in her 60's s/p b/l mastectomy. The patients sister had breast cancer diagnosed around age 28; she is not aware of any genetic testing being completed by any family members. The  patients paternal aunt was diagnosed with cancer of the reproductive organs, but is unsure what the primary cancer was.

## 2023-05-25 NOTE — DATA REVIEWED
[FreeTextEntry1] : 4/26/23 ([facility]) B/l diag mammo & b/l US: extremely dense. No mammographic/sonographic evidence of malignancy, including corresponding to bilateral palpable regions.Correlation with comprehensive breast exam is recommended as the decision to biopsy a palpable finding should always include clinical factors. Patient meets consensus guidelines for annual screening breast MRI as a supplement to annual screening mammography given strong family history of breast cancer in her mother and diagnostically limited mammogram secondary to dense underlying glandular tissue with an elevated lifetime risk of 51.3%. BI-RADS 2. \par \par 5/6/23 ([facility]) MRI: No suspicious enhancement to warrant biopsy. BI-RADS 2.

## 2023-05-25 NOTE — PAST MEDICAL HISTORY
[Menstruating] : The patient is menstruating [Excessive Bleeding] : there was excessive bleeding [Irregular Cycle Intervals] : are  irregular [Total Preg ___] : G[unfilled] [Full Term ___] : Full Term: [unfilled] [Premature ___] : Premature: [unfilled] [Abortions ___] : Abortions:[unfilled] [Age At Live Birth ___] : Age at live birth: [unfilled] [History of Hormone Replacement Treatment] : has no history of hormone replacement treatment [de-identified] : 03/23/2023 [FreeTextEntry5] : none [FreeTextEntry6] : none [FreeTextEntry7] : none [FreeTextEntry8] : yes both kids

## 2023-05-30 ENCOUNTER — NON-APPOINTMENT (OUTPATIENT)
Age: 53
End: 2023-05-30

## 2023-05-30 LAB — CALPROTECTIN FECAL: <5 UG/G

## 2023-05-31 ENCOUNTER — APPOINTMENT (OUTPATIENT)
Dept: GASTROENTEROLOGY | Facility: CLINIC | Age: 53
End: 2023-05-31

## 2023-06-01 ENCOUNTER — APPOINTMENT (OUTPATIENT)
Age: 53
End: 2023-06-01
Payer: MEDICAID

## 2023-06-01 PROCEDURE — G0121 COLON CA SCRN NOT HI RSK IND: CPT

## 2023-06-06 ENCOUNTER — APPOINTMENT (OUTPATIENT)
Dept: DERMATOLOGY | Facility: CLINIC | Age: 53
End: 2023-06-06
Payer: MEDICAID

## 2023-06-06 DIAGNOSIS — D18.01 HEMANGIOMA OF SKIN AND SUBCUTANEOUS TISSUE: ICD-10-CM

## 2023-06-06 DIAGNOSIS — L81.4 OTHER MELANIN HYPERPIGMENTATION: ICD-10-CM

## 2023-06-06 DIAGNOSIS — D23.9 OTHER BENIGN NEOPLASM OF SKIN, UNSPECIFIED: ICD-10-CM

## 2023-06-06 DIAGNOSIS — D22.9 MELANOCYTIC NEVI, UNSPECIFIED: ICD-10-CM

## 2023-06-06 DIAGNOSIS — L82.1 OTHER SEBORRHEIC KERATOSIS: ICD-10-CM

## 2023-06-06 DIAGNOSIS — Z12.83 ENCOUNTER FOR SCREENING FOR MALIGNANT NEOPLASM OF SKIN: ICD-10-CM

## 2023-06-06 PROCEDURE — 99203 OFFICE O/P NEW LOW 30 MIN: CPT

## 2023-06-06 NOTE — HISTORY OF PRESENT ILLNESS
[FreeTextEntry1] : Skin lesions - NPA [de-identified] : # Mole/skin check\par Concerns today: Growth on L cheek, skin tags around neck, growth on lower leg, raised spots on lower back\par Sunscreen use: Yes\par Hx of indoor tanning: Yes > 30 years ago\par Personal history of skin cancer: None\par Family history of melanoma: Father with melanoma (only tx with surgery) on the vertex scalp late in life\par \par

## 2023-06-06 NOTE — ASSESSMENT
[FreeTextEntry1] : # Skin tags\par - Benign, reassured\par - OOP for snip excision 150 up to 7; will schedule if desires\par \par # Cherry angiomas\par # Seborrheic keratosis/macular seborrheic keratosis\par - Benign, reassured\par - If desires cosmetic removal of SK on L cheek would recommend LN2; R/B/E discussed including potential dyspigmentation; will add on no fee if treating skin tags\par \par # Dermatofibroma\par - Benign, reassured\par \par # Lentigines\par # Multiple nevi\par - Benign appearing on today's examination\par - Monitor for change\par \par # Skin cancer screening\par - A complete skin exam was performed with exception of toenails; to return without polish\par - No concerning lesions identified\par - Photoprotection was discussed including sunscreen\par - Call for new or changing lesions\par - CBE yearly\par \par

## 2023-06-06 NOTE — PHYSICAL EXAM
[Alert] : alert [Oriented x 3] : ~L oriented x 3 [Full Body Skin Exam Performed] : performed [FreeTextEntry3] : Pt consented to examination of external genitalia and buttocks\par Opaque nail polish on toenails\par \par Small erythematous papules scattered including on lower back\par Stuck on brown papules scattered including on L forearm and L perinasal cheek\par Symmetric brown macules and papules. No ABCD features. No concerning features on dermoscopy.\par Brown/tan ill defined macules on sun exposed areas\par Pedunculated skin toned papules around neck and L upper eyelid\par Firm pink-brown papule on posterior ankle/lower leg

## 2023-06-07 ENCOUNTER — APPOINTMENT (OUTPATIENT)
Dept: HEMATOLOGY ONCOLOGY | Facility: CLINIC | Age: 53
End: 2023-06-07

## 2023-06-08 ENCOUNTER — NON-APPOINTMENT (OUTPATIENT)
Age: 53
End: 2023-06-08

## 2023-06-12 ENCOUNTER — APPOINTMENT (OUTPATIENT)
Dept: NEUROLOGY | Facility: CLINIC | Age: 53
End: 2023-06-12
Payer: MEDICAID

## 2023-06-12 ENCOUNTER — NON-APPOINTMENT (OUTPATIENT)
Age: 53
End: 2023-06-12

## 2023-06-12 VITALS
TEMPERATURE: 98.4 F | HEART RATE: 76 BPM | WEIGHT: 140 LBS | DIASTOLIC BLOOD PRESSURE: 73 MMHG | SYSTOLIC BLOOD PRESSURE: 111 MMHG | BODY MASS INDEX: 23.9 KG/M2 | HEIGHT: 64 IN | OXYGEN SATURATION: 96 %

## 2023-06-12 PROCEDURE — 99205 OFFICE O/P NEW HI 60 MIN: CPT

## 2023-06-12 NOTE — PHYSICAL EXAM
[FreeTextEntry1] : General: this is a pleasant patient in no acute distress\par \par HEENT conjunctiva are normal, no tenderness in head\par \par CV: normal pulses, regular rate and rhythm, no peripheral edema noted\par \par Lungs: breathing is non-labored\par \par abd: soft and non-distended\par \par MSK:\par SLR: \par TORSTEN:\par range of motion:\par tinnels: \par spurling:\par Occipital nerve tenderness:\par \par Mental status:\par Alert and oriented to person, place and time, normal speech and comprehension\par \par Cranial Nerves:\par extra-occular movements in tact without nystagmus, normal saccades and smooth pursuit, Face symmetric and facial strength symmetric, facial sensation symmetric, reports only binouclar diplopia on b/l lateral gaze but I don't see any clear EOM weakness. \par \par Motor: normal bulk and tone throughout. no abnormal movements.  Full 5/5 strength uppers and lower extremities proximally and distally\par \par Sensory: in tact and symmetric to vibration, light tough, temperature\par \par Cerebellar: normal finger-nose-finger bilaterally\par \par Reflexes: 2+ in the upper and lower extremities and symmetric.  toes are bilaterally downgoing.\par \par Gait: stable, able to tip toe heel and tandem\par \par Stances:\par Romberg: normal\par \par \par

## 2023-06-12 NOTE — HISTORY OF PRESENT ILLNESS
[FreeTextEntry1] : SHAE TAYLOR is a 52 year who presents with headaches\par \par Long hx of headaches.  When she has headaches\par \par #1) spike behind either eye.  typically start on awakening or starts as pain in her forehead/sinuses.  Benadryl doesn't eliminate those headaches. + photophobia and phonophobia is pronounced, can be throbbing, these can last for 2-3 days at times.  these happen once to twice monthly in general but seemed to increase since COVID\par \par #2) tension headaches send to happen at the base of the neck and go up the head.  also can last days.  less photophobia and phonophobia.  these happen 2-3 times monthly.\par \par #3) sinus headaches, frontal, vertex, benadryl doesn't really work.  not as much photophobia and phonophobia as the others. \par \par she has had migraine auras where vision got very wavy prior to a headache.  That happens infrequently.  \par \par also does often have migraine hangover. \par \par having diplopia in the last few months\par \par abortive medications: zolmitriptan was best of the triptans, worked about 60% of the time. also has tried imitrex which sometimes worked (40%). rizatriptan was not effective at all. also tried eletriptan. hasn't tried nurtec or ubrelvy.\par \par Preventitive medications:  wellbutrin didn't help. topiramate didn't help.  nortrptyline didn't help.  beta blockers didn't help.  doesn't think she tried SNRIs.  \par \par Behavioral:\par sleeps 7-8 hours.  feels rested if she sleeps 8 hours but is often sleep deprived\par exercises with soccer and walks many miles per day and does planks/pushups\par single mother with 2 kids 13 and 10\par \par She feels like her lungs are at 1/4 capacity. \par \par Also hx of add\par \par \par Denies dysphagia, dysarthria, aphasia, focal weakness or numbness, bowel or bladder dysfunction, imbalance.  did fall in last year tripping on the sidewalk in March 2023.  \par

## 2023-06-13 LAB — CK SERPL-CCNC: 122 U/L

## 2023-06-14 LAB — ALDOLASE SERPL-CCNC: 6.8 U/L

## 2023-06-16 LAB
ACHR BLOCK AB SER QL: 9 %
ACRM BINDING ANTIBODY: <0.03 NMOL/L

## 2023-06-19 ENCOUNTER — NON-APPOINTMENT (OUTPATIENT)
Age: 53
End: 2023-06-19

## 2023-06-20 ENCOUNTER — APPOINTMENT (OUTPATIENT)
Dept: DERMATOLOGY | Facility: CLINIC | Age: 53
End: 2023-06-20

## 2023-06-20 LAB
AMPHIPHYSIN IGG TITR SER IF: NEGATIVE
ANNOTATION COMMENT IMP: NORMAL
CV2 IGG TITR SER: NEGATIVE
HU1 AB TITR SER: NEGATIVE
HU2 AB TITR SER IF: NEGATIVE
HU3 AB TITR SER: NEGATIVE
INTERPRETIVE COMMENTS: NORMAL
MUSK ANTIBODY TEST: NORMAL
PCA-1 AB TITR SER: NEGATIVE
PCA-2 AB TITR SER: NEGATIVE
PCA-TR AB TITR SER: NEGATIVE
VGCC-P/Q BIND AB SER-SCNC: 0 NMOL/L
VGKC AB SER-SCNC: 0 NMOL/L

## 2023-06-23 LAB — ACHR MOD AB SER-ACNC: 0 %

## 2023-06-26 ENCOUNTER — APPOINTMENT (OUTPATIENT)
Dept: FAMILY MEDICINE | Facility: CLINIC | Age: 53
End: 2023-06-26
Payer: MEDICAID

## 2023-06-26 VITALS
TEMPERATURE: 98.2 F | HEIGHT: 64 IN | SYSTOLIC BLOOD PRESSURE: 107 MMHG | WEIGHT: 140.5 LBS | DIASTOLIC BLOOD PRESSURE: 71 MMHG | BODY MASS INDEX: 23.99 KG/M2 | HEART RATE: 76 BPM | OXYGEN SATURATION: 96 %

## 2023-06-26 DIAGNOSIS — R21 RASH AND OTHER NONSPECIFIC SKIN ERUPTION: ICD-10-CM

## 2023-06-26 PROCEDURE — 99214 OFFICE O/P EST MOD 30 MIN: CPT

## 2023-06-26 NOTE — PLAN
[FreeTextEntry1] : adhd \par nys  was checked \par reviewed risks, benefits, side effects, regimen, alternatives\par follow up in 30 days, sooner if needed \par \par rash \par rash on upper chest, right neck \par is on steriod from derm, started yesterday, improving \par will send allergy meds-- reviewed risks, benefits, side effects, alternatives, reigmen (will take before bed) \par \par knee arthritis \par will have xrays sent from [facility]

## 2023-06-26 NOTE — HISTORY OF PRESENT ILLNESS
[FreeTextEntry1] : mental health follow up \par  [de-identified] : 53 yo f presents with hx of adderall use \par in need of a refill \par mentioned has not been able to receive the medication \par adhd not stable \par did check nys , stable \par \par pending follow up with neuro and neuro optho for work up \par \par also with tenderness behind her knee

## 2023-07-17 ENCOUNTER — OUTPATIENT (OUTPATIENT)
Dept: OUTPATIENT SERVICES | Facility: HOSPITAL | Age: 53
LOS: 1 days | End: 2023-07-17

## 2023-07-17 ENCOUNTER — APPOINTMENT (OUTPATIENT)
Dept: MRI IMAGING | Facility: CLINIC | Age: 53
End: 2023-07-17
Payer: MEDICAID

## 2023-07-17 PROCEDURE — 70553 MRI BRAIN STEM W/O & W/DYE: CPT | Mod: 26

## 2023-07-17 PROCEDURE — 70543 MRI ORBT/FAC/NCK W/O &W/DYE: CPT | Mod: 26

## 2023-07-26 NOTE — HEALTH RISK ASSESSMENT
[Good] : ~his/her~  mood as  good [Monthly or less (1 pt)] : Monthly or less (1 point) [1 or 2 (0 pts)] : 1 or 2 (0 points) [Never (0 pts)] : Never (0 points) [No] : In the past 12 months have you used drugs other than those required for medical reasons? No [1] : 2) Feeling down, depressed, or hopeless for several days (1) [PHQ-2 Positive] : PHQ-2 Positive [I have developed a follow-up plan documented below in the note.] : I have developed a follow-up plan documented below in the note. [Patient reported mammogram was abnormal] : Patient reported mammogram was abnormal [Patient reported PAP Smear was normal] : Patient reported PAP Smear was normal [Patient declined colonoscopy] : Patient declined colonoscopy [HIV Test offered] : HIV Test offered [Hepatitis C test offered] : Hepatitis C test offered [None] : None [Employed] : employed [] :  [Sexually Active] : sexually active [Fully functional (bathing, dressing, toileting, transferring, walking, feeding)] : Fully functional (bathing, dressing, toileting, transferring, walking, feeding) [Fully functional (using the telephone, shopping, preparing meals, housekeeping, doing laundry, using] : Fully functional and needs no help or supervision to perform IADLs (using the telephone, shopping, preparing meals, housekeeping, doing laundry, using transportation, managing medications and managing finances) [Never] : Never [Audit-CScore] : 1 [de-identified] : exercises  [de-identified] : fruits, veggies  [[id number]] : 2 [Change in mental status noted] : No change in mental status noted [Language] : denies difficulty with language [Reports changes in hearing] : Reports no changes in hearing [Reports changes in vision] : Reports no changes in vision [MammogramDate] : 12/22 [MammogramComments] : needs an referral  [ColonoscopyComments] : will refer  [de-identified] : projects

## 2023-07-26 NOTE — PLAN
[FreeTextEntry1] : abdominal distention \par worsening per patient report \par will do trial of meds \par will r/o abnormalities with labs \par labs drawn in office \par \par mris pending for chest, arm \par \par extensive period of time spent counseling patient and discussing results and further workup \par patient distressed based on social situation-- living environment, divorce, trouble finding work that pays well, stress at home taking after her kids, feeling as though her ex  and the judges are not treating her fairly \par encouraged to follow up and seek mental health, mentioned pending discussion with mental health providers  \par  [id number]

## 2023-07-26 NOTE — HISTORY OF PRESENT ILLNESS
[FreeTextEntry1] : abdominal distention \par pending MRI chest \par pending MRI arm  [de-identified] : 53 yo f presents with abdominal distention. \par Here to repeat h. pylori testing, also to r/o gluten sensitivities. \par MRIs pending. \par

## 2023-07-26 NOTE — HISTORY OF PRESENT ILLNESS
[FreeTextEntry1] : establish care\par  [de-identified] : 51 yo f presents to establish care. \par [facility]s [facility] annually. \par Hx of breast cancer in the family, interested in follow up screens. \par Psychotherapist passed away, mentioned has not been on meds or in therapy since Oct. \par Also with painful sweling on her right forearm, was seen for urgent care, was told may need to see ortho. \par Also with mole on her face, interested in follow up derm. \par Had covid twice, feels like she has trouble breathing.

## 2023-07-28 ENCOUNTER — APPOINTMENT (OUTPATIENT)
Dept: DERMATOLOGY | Facility: CLINIC | Age: 53
End: 2023-07-28

## 2023-08-07 ENCOUNTER — APPOINTMENT (OUTPATIENT)
Dept: FAMILY MEDICINE | Facility: CLINIC | Age: 53
End: 2023-08-07
Payer: MEDICAID

## 2023-08-07 VITALS
WEIGHT: 140 LBS | DIASTOLIC BLOOD PRESSURE: 74 MMHG | SYSTOLIC BLOOD PRESSURE: 110 MMHG | OXYGEN SATURATION: 96 % | TEMPERATURE: 98.5 F | BODY MASS INDEX: 23.9 KG/M2 | HEIGHT: 64 IN | HEART RATE: 83 BPM

## 2023-08-07 PROCEDURE — 99214 OFFICE O/P EST MOD 30 MIN: CPT

## 2023-08-09 LAB
CRP SERPL-MCNC: <3 MG/L
ERYTHROCYTE [SEDIMENTATION RATE] IN BLOOD BY WESTERGREN METHOD: 3 MM/HR

## 2023-08-10 ENCOUNTER — APPOINTMENT (OUTPATIENT)
Dept: FAMILY MEDICINE | Facility: CLINIC | Age: 53
End: 2023-08-10
Payer: MEDICAID

## 2023-08-10 VITALS
SYSTOLIC BLOOD PRESSURE: 104 MMHG | DIASTOLIC BLOOD PRESSURE: 71 MMHG | HEIGHT: 64 IN | HEART RATE: 71 BPM | TEMPERATURE: 98.7 F | WEIGHT: 140 LBS | OXYGEN SATURATION: 97 % | BODY MASS INDEX: 23.9 KG/M2

## 2023-08-10 LAB — ANA SER IF-ACNC: NEGATIVE

## 2023-08-10 PROCEDURE — XXXXX: CPT | Mod: 1L

## 2023-08-10 PROCEDURE — 99215 OFFICE O/P EST HI 40 MIN: CPT | Mod: 1L

## 2023-08-10 NOTE — HISTORY OF PRESENT ILLNESS
[FreeTextEntry1] : knee pain adhd   [de-identified] : 51 yo f presents with knee pain, MRI pending 8/16 pending visit with ortho as well   mentioned needs increased dose of ADHD meds  has needed IR dose of meds, has not been able to  ER dose  mentioned has needed to use more IR  in need of a refill-- higher dose   pending appt for therapy, 8/14 has been following up with derm, on and off of antibiotic creams  has yet to follow up with gyn

## 2023-08-15 NOTE — PLAN
[FreeTextEntry1] : adhd  stable  cont meds   knee pain  mri ordered  reviewed ortho notes with patient  plan pending mri  ortho pending as well

## 2023-08-15 NOTE — HISTORY OF PRESENT ILLNESS
[FreeTextEntry1] : knee pain  [de-identified] : 51 yo f presents with knee pain  not improving mentioned pain worsens with stairs and movement  trouble with exercise and ADLs

## 2023-08-16 ENCOUNTER — APPOINTMENT (OUTPATIENT)
Dept: ORTHOPEDIC SURGERY | Facility: CLINIC | Age: 53
End: 2023-08-16
Payer: MEDICAID

## 2023-08-16 ENCOUNTER — APPOINTMENT (OUTPATIENT)
Dept: MRI IMAGING | Facility: CLINIC | Age: 53
End: 2023-08-16

## 2023-08-16 PROCEDURE — 99214 OFFICE O/P EST MOD 30 MIN: CPT | Mod: 25

## 2023-08-16 PROCEDURE — 20610 DRAIN/INJ JOINT/BURSA W/O US: CPT | Mod: 50

## 2023-08-16 PROCEDURE — 73030 X-RAY EXAM OF SHOULDER: CPT | Mod: RT

## 2023-08-16 NOTE — ASSESSMENT
[FreeTextEntry1] : Discussed at length with patient underlying arthritis and I reviewed in detail the nature of arthritis and treatment options at this time patient elects formal physical therapy cortisone injection as well as physical therapy for the shoulder

## 2023-08-16 NOTE — PHYSICAL EXAM
[de-identified] : Bilateral knee  Constitutional:  The patient is healthy-appearing and in no apparent distress.   Gait: The patient ambulates with a normal gait and no limp.  Cardiovascular System:  The capillary refill is less than 2 seconds.   Skin:  There are no skin abnormalities.  Bilateral Knee:   Bony Palpation:  There is no tenderness of the medial joint line.  There is no tenderness of the lateral joint line. There is no tenderness of the medial femoral chondyle. There is no tenderness of the lateral femoral chondyle. There is no tenderness of the tibial tubercle. There is no tenderness of the superior patella. There is no tenderness of the inferior patella. There is tenderness of the medial patellar facet. There is tenderness of the lateral patellar facet.  Soft Tissue Palpation:  There is no tenderness of the medial retinaculum. There is no tenderness of the lateral retinaculum. There is no tenderness of the quadriceps tendon. There is no tenderness of the patella tendon. There is no tenderness of the ITB. There is no tenderness of the pes anserine.  Active Range of Motion:  The range of motion at the knee actively and passively is full.   Special Tests:  There is a negative Apley. There is a negative Steinmanns.  There is a negative Lachman and Anterior Drawer. There is a negative Posterior Drawer.   There is no varus or valgus laxity.  Strength:  There is 4/5 hip flexion and 5/5 knee flexion and extension.    Psychiatric:  The patient demonstrates a normal mood and affect and is active and alert.  Right Shoulder:  Cardiovascular System:  The capillary refill is less than 2 seconds.   Skin:  There are no skin abnormalities.  C-Spine/Neck:  Active Range of Motion: Flexion				50 Extension			60 Lateral rotation			80    Right Shoulder:  Inspection:  There is no atrophy, erythema, warmth, swelling. There is no scapular winging. There is no AC prominence.   Bony Palpation:  There is no tenderness of the clavicle. There is no tenderness of the acromioclavicular joint. There is no tenderness of the greater tuberosity.  There is no tenderness of the bicipital groove.   Soft Tissue Palpation:  There is no tenderness of the trapezius. There is no tenderness of the rhomboid. There is no tenderness of the subacromial bursa.   Active Range of Motion:  Forward flexion- 				180  Abduction-					150 External rotation at 0 degrees abduction-	80  Internal rotation at 0 degrees abduction-	80  Passive Range of Motion:  Forward flexion- 			180  Abduction-				150 External rotation at 0 deg abduction-	80  Internal rotation at 0 deg abduction-	80  Special Tests:  Hawkin's  				Positive  Neer's  				Positive  Speed's  				Negative AC cross-over 			            Negative Dolores's  				Negative  Stability:  There is no general laxity.  There is no anterior apprehension.  Strength:  Supraspinatus abduction 		5/5 External rotation at 0 deg abduction 	5/5 Internal rotation at 0 deg abduction	5/5 Scapular elevation 			5/5   Neurological System:   There is normal sensation to light touch C5-T1.   Stability:  There is no general laxity.   [de-identified] : X-ray right shoulder: There is no significant bony / soft tissue abnormality, arthritis, or fracture except a type II acromion

## 2023-08-16 NOTE — HISTORY OF PRESENT ILLNESS
[de-identified] : Follow up for Right Knee / New Pain at Right Shoulder  Reason for Right Shoulder - denies Injury - Diagnosed with Bursitis +5 Years ago Prior Studies: Corticosteroid Injection / PT - Helpful   Last Visit:04/25/2023 Pain Level: Worse Symptoms: Swelling / Radiating Pain

## 2023-08-16 NOTE — PROCEDURE

## 2023-08-21 ENCOUNTER — EMERGENCY (EMERGENCY)
Facility: HOSPITAL | Age: 53
LOS: 1 days | Discharge: HOME | End: 2023-08-21
Admitting: EMERGENCY MEDICINE
Payer: MEDICAID

## 2023-08-21 VITALS
TEMPERATURE: 98 F | RESPIRATION RATE: 16 BRPM | DIASTOLIC BLOOD PRESSURE: 70 MMHG | SYSTOLIC BLOOD PRESSURE: 101 MMHG | HEART RATE: 84 BPM | OXYGEN SATURATION: 95 %

## 2023-08-21 PROCEDURE — 99283 EMERGENCY DEPT VISIT LOW MDM: CPT

## 2023-08-21 RX ORDER — CIPROFLOXACIN HCL 0.3 %
1 DROPS OPHTHALMIC (EYE) EVERY 6 HOURS
Refills: 0 | Status: DISCONTINUED | OUTPATIENT
Start: 2023-08-21 | End: 2023-08-25

## 2023-08-21 RX ADMIN — Medication 1 DROP(S): at 14:55

## 2023-08-21 NOTE — ED PROVIDER NOTE - CLINICAL SUMMARY MEDICAL DECISION MAKING FREE TEXT BOX
Patient here with right sided eye pain and redness s/p moving boxes yesterday   Exam with suspected corneal ulcer.   Plan: Abx and urgent optho follow up

## 2023-08-21 NOTE — ED ADULT TRIAGE NOTE - CHIEF COMPLAINT QUOTE
Pt states right eye redness and pain since yesterday. pt unsure if she had a head strike, denies taking blood thinners. Pt states continued eye issues with right eye, had mri previously and is following up with neuro ophthalmologist

## 2023-08-21 NOTE — ED PROVIDER NOTE - PATIENT PORTAL LINK FT
You can access the FollowMyHealth Patient Portal offered by [facility] by registering at the following website: [email or web address]. By joining [employer]’s FollowMyHealth portal, you will also be able to view your health information using other applications (apps) compatible with our system.

## 2023-08-21 NOTE — ED PROVIDER NOTE - NSFOLLOWUPINSTRUCTIONS_ED_ALL_ED_FT
Follow up with [facility] today  [facility]  Our Walk-in Clinic is open between the hours of 8:00am and 3:30pm, Monday through Friday.  Eye Clinic  Tel: [phone]  Address  [address], [address] [place] [address]  Return immediately for any new or worsening symptoms or any new concerns    Follow up with your primary care doctor or clinics listed below if you do not have a doctor  Return immediately for any new or worsening symptoms or any new concerns

## 2023-08-21 NOTE — ED PROVIDER NOTE - OBJECTIVE STATEMENT
53 y/o female here with right sided eye pain and redness since last night. Patient appears well NAD stable. States she was moving some boxes around and felt an irritation. Denies fever, chills, hematuria, vaginal bleeding, d/c, abdominal pain, change in bowel function, flank pain, rash, HA, dizziness, SOB, CP, palpitations, diaphoresis, cough, and malaise.  Appearing well NAD stable.

## 2023-08-21 NOTE — ED ADULT NURSE NOTE - NSFALLUNIVINTERV_ED_ALL_ED
Bed/Stretcher in lowest position, wheels locked, appropriate side rails in place/Call bell, personal items and telephone in reach/Instruct patient to call for assistance before getting out of bed/chair/stretcher/Non-slip footwear applied when patient is off stretcher/[place] to call system/Physically safe environment - no spills, clutter or unnecessary equipment/Purposeful proactive rounding/Room/bathroom lighting operational, light cord in reach

## 2023-08-25 DIAGNOSIS — G43.909 MIGRAINE, UNSPECIFIED, NOT INTRACTABLE, WITHOUT STATUS MIGRAINOSUS: ICD-10-CM

## 2023-08-25 DIAGNOSIS — Z91.018 ALLERGY TO OTHER FOODS: ICD-10-CM

## 2023-08-25 DIAGNOSIS — H16.001 UNSPECIFIED CORNEAL ULCER, RIGHT EYE: ICD-10-CM

## 2023-08-25 DIAGNOSIS — H57.11 OCULAR PAIN, RIGHT EYE: ICD-10-CM

## 2023-08-25 DIAGNOSIS — F90.9 ATTENTION-DEFICIT HYPERACTIVITY DISORDER, UNSPECIFIED TYPE: ICD-10-CM

## 2023-10-02 NOTE — PHYSICAL EXAM
[Normal Sclera/Conjunctiva] : normal sclera/conjunctiva [Normal Outer Ear/Nose] : the outer ears and nose were normal in appearance [Normal] : affect was normal and insight and judgment were intact [de-identified] : decreased ROM b/l, pain with movement, swelling  Secondary Intention Text (Leave Blank If You Do Not Want): The defect will heal with secondary intention.

## 2023-10-10 NOTE — ED ADULT TRIAGE NOTE - CADM TRG TX PRIOR TO ARRIVAL
History  Chief Complaint   Patient presents with    Shoulder Pain     Left shoulder and neck pain x3 years. Follows with pain management. Today presenting with same pain, unable to get in to see pain dr. Jennifer Marino "numbness" in left arm. Patient is a 48 y/o F with h/o chronic neck pain presents to the ED with worsening neck pain. She states the pain radiates down her entire arm and she has had numbness in her fingers. She is followed by pain management and take ultram, soma, xanax, but states it isn't helping her pain. She denies recent injury. No new symptoms. Pain is worse with movement of head. She states she sees pain management and neurosurgery and " [facility]," but they told her she just has to live with chronic pain. Patient denies recent illness or fevers. Shoulder Pain  Associated symptoms: neck pain    Associated symptoms: no back pain and no fever        Prior to Admission Medications   Prescriptions Last Dose Informant Patient Reported? Taking? ALPRAZolam (XANAX) 1 mg tablet   Yes No   Sig: Take 1 mg by mouth 3 (three) times a day   QUEtiapine (SEROquel) 25 mg tablet   Yes No   Sig: Take 25 mg by mouth 4 (four) times a day   atorvastatin (LIPITOR) 20 mg tablet   Yes No   Sig: Take 20 mg by mouth daily   carisoprodol (SOMA) 350 mg tablet   Yes No   Sig: Take 350 mg by mouth daily   gabapentin (NEURONTIN) 600 MG tablet   Yes No   Sig: Take 600 mg by mouth 4 (four) times a day   hyoscyamine (LEVSIN/SL) 0.125 mg SL tablet   Yes No   Sig: PLACE AND DISSOLVE ONE TABLET UNDER THE TONGUE THREE TIMES A DAY AS NEEDED FOR ABDOMINAL PAIN   traMADol (ULTRAM) 50 mg tablet   Yes No   Sig: TAKE 1 TABLET BY MOUTH 3 TIMES A DAY (EARLIEST FILL 10/24/21)      Facility-Administered Medications: None       History reviewed. No pertinent past medical history. History reviewed. No pertinent surgical history. History reviewed. No pertinent family history.   I have reviewed and agree with the history as documented. E-Cigarette/Vaping     E-Cigarette/Vaping Substances     Social History     Tobacco Use    Smoking status: Every Day     Packs/day: 0.50     Types: Cigarettes    Smokeless tobacco: Never   Substance Use Topics    Alcohol use: Never    Drug use: Yes     Types: Marijuana       Review of Systems   Constitutional:  Negative for chills and fever. Genitourinary:  Negative for dysuria. Musculoskeletal:  Positive for neck pain. Negative for back pain. Skin:  Negative for color change, pallor and rash. Neurological:  Negative for dizziness, weakness and numbness. Psychiatric/Behavioral:  Negative for confusion. All other systems reviewed and are negative. Physical Exam  Physical Exam  Vitals and nursing note reviewed. Constitutional:       General: She is not in acute distress. Appearance: Normal appearance. She is well-developed, well-groomed and normal weight. HENT:      Head: Normocephalic and atraumatic. Right Ear: Hearing normal.      Left Ear: Hearing normal.      Nose: Nose normal.   Eyes:      Conjunctiva/sclera: Conjunctivae normal.   Cardiovascular:      Rate and Rhythm: Normal rate and regular rhythm. Pulmonary:      Effort: Pulmonary effort is normal.      Breath sounds: Normal breath sounds. Musculoskeletal:      Cervical back: Normal range of motion and neck supple. No rigidity. Muscular tenderness (left trapezius. ) present. No spinous process tenderness. Skin:     General: Skin is warm and dry. Coloration: Skin is not pale. Findings: No bruising, erythema or rash. Neurological:      Mental Status: She is alert and oriented to person, place, and time. GCS: GCS eye subscore is 4. GCS verbal subscore is 5. GCS motor subscore is 6. Cranial Nerves: Cranial nerves 2-12 are intact. Sensory: Sensation is intact. No sensory deficit. Motor: Motor function is intact. No weakness, tremor or abnormal muscle tone. Gait: Gait is intact. Psychiatric:         Mood and Affect: Mood normal.         Speech: Speech normal.         Behavior: Behavior is cooperative. Vital Signs  ED Triage Vitals [10/10/23 1548]   Temperature Pulse Respirations Blood Pressure SpO2   (!) 97.4 °F (36.3 °C) 89 20 100/71 97 %      Temp Source Heart Rate Source Patient Position - Orthostatic VS BP Location FiO2 (%)   Temporal Monitor Sitting Left arm --      Pain Score       10 - Worst Possible Pain           Vitals:    10/10/23 1548   BP: 100/71   Pulse: 89   Patient Position - Orthostatic VS: Sitting         Visual Acuity      ED Medications  Medications   lidocaine (LIDODERM) 5 % patch 1 patch (1 patch Topical Medication Applied 10/10/23 1618)   methocarbamol (ROBAXIN) tablet 500 mg (500 mg Oral Given 10/10/23 1617)   ketorolac (TORADOL) injection 30 mg (30 mg Intramuscular Given 10/10/23 1618)       Diagnostic Studies  Results Reviewed       None                   No orders to display              Procedures  Procedures         ED Course                               SBIRT 22yo+      Flowsheet Row Most Recent Value   Initial Alcohol Screen: US AUDIT-C     1. How often do you have a drink containing alcohol? 0 Filed at: 10/10/2023 1558   2. How many drinks containing alcohol do you have on a typical day you are drinking? 0 Filed at: 10/10/2023 1558   3a. Male UNDER 65: How often do you have five or more drinks on one occasion? 0 Filed at: 10/10/2023 1558   3b. FEMALE Any Age, or MALE 65+: How often do you have 4 or more drinks on one occassion? 0 Filed at: 10/10/2023 1558   Audit-C Score 0 Filed at: 10/10/2023 1558   DUANE: How many times in the past year have you. .. Used an illegal drug or used a prescription medication for non-medical reasons?  Never Filed at: 10/10/2023 1558                      Medical Decision Making  Patient with chronic neck pain, here with worsening neck pain, no new neuro symptoms, will give pain meds and refer to neurosurgery and her pain management doctor. I reviewed MRI from 8/11/2023. Amount and/or Complexity of Data Reviewed  External Data Reviewed: radiology and notes. Risk  Prescription drug management. Disposition  Final diagnoses:   Chronic neck pain     Time reflects when diagnosis was documented in both MDM as applicable and the Disposition within this note       Time User Action Codes Description Comment    10/10/2023  4:12 PM Georgann Kanner Add [O15.3,  G89.29] Chronic neck pain           ED Disposition       ED Disposition   Discharge    Condition   Stable    Date/Time   Tue Oct 10, 2023 [address] discharge to home/self care.                    Follow-up Information       Follow up With Specialties Details Why Contact Info Additional Information    [facility] Schedule an appointment as soon as possible for a visit  For recheck Sallie Davies [address] [facility] [id number]  [address] [place] [address] Neurosurgical [address] [place] [address], [address] [place] [address], [id number] [phone]            Discharge Medication List as of 10/10/2023  4:12 PM        CONTINUE these medications which have NOT CHANGED    Details   ALPRAZolam Fernie Garrard) 1 mg tablet Take 1 mg by mouth 3 (three) times a day, Starting Sun 12/12/2021, Historical Med      atorvastatin (LIPITOR) 20 mg tablet Take 20 mg by mouth daily, Starting Sat 12/11/2021, Historical Med      carisoprodol (SOMA) 350 mg tablet Take 350 mg by mouth daily, Starting Sat 12/11/2021, Historical Med      gabapentin (NEURONTIN) 600 MG tablet Take 600 mg by mouth 4 (four) times a day, Starting Wed 12/29/2021, Historical Med      hyoscyamine (LEVSIN/SL) 0.125 mg SL tablet PLACE AND DISSOLVE ONE TABLET UNDER THE TONGUE THREE TIMES A DAY AS NEEDED FOR ABDOMINAL PAIN, Historical Med      QUEtiapine (SEROquel) 25 mg tablet Take 25 mg by mouth 4 (four) times a day, Starting Wed 12/1/2021, Historical Med      traMADol (ULTRAM) 50 mg tablet TAKE 1 TABLET BY MOUTH 3 TIMES A DAY (EARLIEST FILL 10/24/21), Historical Med             No discharge procedures on file.     PDMP Review         Value Time User    PDMP Reviewed  Yes 10/10/2023  3:51 PM Sunil Jones PA-C            ED Provider  Electronically Signed by             Sunil Jones PA-C  10/10/23 6485 none

## 2023-11-13 ENCOUNTER — NON-APPOINTMENT (OUTPATIENT)
Age: 53
End: 2023-11-13

## 2023-11-14 ENCOUNTER — TRANSCRIPTION ENCOUNTER (OUTPATIENT)
Age: 53
End: 2023-11-14

## 2023-11-15 ENCOUNTER — APPOINTMENT (OUTPATIENT)
Dept: ORTHOPEDIC SURGERY | Facility: CLINIC | Age: 53
End: 2023-11-15
Payer: MEDICAID

## 2023-11-15 DIAGNOSIS — M75.51 BURSITIS OF RIGHT SHOULDER: ICD-10-CM

## 2023-11-15 PROCEDURE — 20610 DRAIN/INJ JOINT/BURSA W/O US: CPT | Mod: 50

## 2023-11-15 PROCEDURE — 73562 X-RAY EXAM OF KNEE 3: CPT | Mod: RT

## 2023-11-15 PROCEDURE — 99213 OFFICE O/P EST LOW 20 MIN: CPT | Mod: 25

## 2023-12-19 ENCOUNTER — EMERGENCY (EMERGENCY)
Facility: HOSPITAL | Age: 53
LOS: 1 days | Discharge: HOME | End: 2023-12-19
Admitting: EMERGENCY MEDICINE
Payer: MEDICAID

## 2023-12-19 VITALS
WEIGHT: 130.07 LBS | DIASTOLIC BLOOD PRESSURE: 74 MMHG | HEART RATE: 89 BPM | SYSTOLIC BLOOD PRESSURE: 114 MMHG | OXYGEN SATURATION: 96 % | RESPIRATION RATE: 15 BRPM | TEMPERATURE: 98 F | HEIGHT: 64 IN
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
## 2023-12-19 DIAGNOSIS — M25.572 PAIN IN LEFT ANKLE AND JOINTS OF LEFT FOOT: ICD-10-CM
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
## 2023-12-19 DIAGNOSIS — M25.562 PAIN IN LEFT KNEE: ICD-10-CM

## 2023-12-19 DIAGNOSIS — S80.212A ABRASION, LEFT KNEE, INITIAL ENCOUNTER: ICD-10-CM

## 2023-12-19 DIAGNOSIS — Z91.018 ALLERGY TO OTHER FOODS: ICD-10-CM

## 2023-12-19 PROCEDURE — 73564 X-RAY EXAM KNEE 4 OR MORE: CPT | Mod: 26,LT

## 2023-12-19 PROCEDURE — 99284 EMERGENCY DEPT VISIT MOD MDM: CPT

## 2023-12-19 PROCEDURE — 73610 X-RAY EXAM OF ANKLE: CPT | Mod: 26,LT

## 2023-12-19 RX ORDER — IBUPROFEN 200 MG
600 TABLET ORAL ONCE
Refills: 0 | Status: COMPLETED | OUTPATIENT
Start: 2023-12-19 | End: 2023-12-19

## 2023-12-19 RX ADMIN — Medication 600 MILLIGRAM(S): at 17:11

## 2023-12-19 NOTE — ED PROVIDER NOTE - NSFOLLOWUPINSTRUCTIONS_ED_ALL_ED_FT
Overview  Injuries are a common cause of knee problems. Sudden (acute) injuries may be caused by a direct blow to the knee. They can also be caused by abnormal twisting, bending, or falling on the knee. Pain, bruising, or swelling may be severe, and may start within minutes of the injury.    Overuse is another cause of knee pain. Other causes are climbing stairs, kneeling, and other activities that use the knee. Everyday wear and tear, especially as you get older, also can cause knee pain.    Rest, along with home treatment, often relieves pain and allows your knee to heal. If you have a serious knee injury, you may need tests and treatment.    Follow-up care is a key part of your treatment and safety. Be sure to make and go to all appointments, and call your doctor or nurse advice line (529 in most provinces and territories) if you are having problems. It's also a good idea to know your test results and keep a list of the medicines you take.    How can you care for yourself at home?  Be safe with medicines. Read and follow all instructions on the label.  If the doctor gave you a prescription medicine for pain, take it as prescribed.  If you are not taking a prescription pain medicine, ask your doctor if you can take an over-the-counter medicine.  Rest and protect your knee. Take a break from any activity that may cause pain.  Put ice or a cold pack on your knee for 10 to 20 minutes at a time. Put a thin cloth between the ice and your skin.  Prop up a sore knee on a pillow when you ice it or anytime you sit or lie down for the next 3 days. Try to keep it above the level of your heart. This will help reduce swelling.  If your knee is not swollen, you can put moist heat, a heating pad, or a warm cloth on your knee.  If your doctor recommends an elastic bandage, sleeve, or other type of support for your knee, wear it as directed.  Follow your doctor's instructions about how much weight you can put on your leg. Use a cane, crutches, or a walker as instructed.  Follow your doctor's instructions about activity during your healing process. If you can do mild exercise, slowly increase your activity.  Stay at a healthy weight. Extra weight can strain the joints, especially the knees and hips, and make the pain worse. Losing a little weight may help.  When should you call for help?  	  Call 911 anytime you think you may need emergency care. For example, call if:    You have symptoms of a blood clot in your lung (called a pulmonary embolism). These may include:  Sudden chest pain.  Trouble breathing.  Coughing up blood.  Call your doctor or nurse advice line now or seek immediate medical care if:    You have severe or increasing pain.  Your leg or foot turns cold or changes colour.  You cannot stand or put weight on your knee.  Your knee looks twisted or bent out of shape.  You cannot move your knee.  You have signs of infection, such as:  Increased pain, swelling, warmth, or redness.  Red streaks leading from the knee.  Pus draining from a place on your knee.  A fever.  You have signs of a blood clot in your leg (called a deep vein thrombosis), such as:  Pain in your calf, back of the knee, thigh, or groin.  Redness and swelling in your leg or groin.  Watch closely for changes in your health, and be sure to contact your doctor or nurse advice line if:    You have tingling, weakness, or numbness in your knee.  You have any new symptoms, such as swelling.  You have bruises from a knee injury that last longer than 2 weeks.  You do not get better as expected. Overview  Injuries are a common cause of knee problems. Sudden (acute) injuries may be caused by a direct blow to the knee. They can also be caused by abnormal twisting, bending, or falling on the knee. Pain, bruising, or swelling may be severe, and may start within minutes of the injury.    Overuse is another cause of knee pain. Other causes are climbing stairs, kneeling, and other activities that use the knee. Everyday wear and tear, especially as you get older, also can cause knee pain.    Rest, along with home treatment, often relieves pain and allows your knee to heal. If you have a serious knee injury, you may need tests and treatment.    Follow-up care is a key part of your treatment and safety. Be sure to make and go to all appointments, and call your doctor or nurse advice line (283 in most provinces and territories) if you are having problems. It's also a good idea to know your test results and keep a list of the medicines you take.    How can you care for yourself at home?  Be safe with medicines. Read and follow all instructions on the label.  If the doctor gave you a prescription medicine for pain, take it as prescribed.  If you are not taking a prescription pain medicine, ask your doctor if you can take an over-the-counter medicine.  Rest and protect your knee. Take a break from any activity that may cause pain.  Put ice or a cold pack on your knee for 10 to 20 minutes at a time. Put a thin cloth between the ice and your skin.  Prop up a sore knee on a pillow when you ice it or anytime you sit or lie down for the next 3 days. Try to keep it above the level of your heart. This will help reduce swelling.  If your knee is not swollen, you can put moist heat, a heating pad, or a warm cloth on your knee.  If your doctor recommends an elastic bandage, sleeve, or other type of support for your knee, wear it as directed.  Follow your doctor's instructions about how much weight you can put on your leg. Use a cane, crutches, or a walker as instructed.  Follow your doctor's instructions about activity during your healing process. If you can do mild exercise, slowly increase your activity.  Stay at a healthy weight. Extra weight can strain the joints, especially the knees and hips, and make the pain worse. Losing a little weight may help.  When should you call for help?  	  Call 911 anytime you think you may need emergency care. For example, call if:    You have symptoms of a blood clot in your lung (called a pulmonary embolism). These may include:  Sudden chest pain.  Trouble breathing.  Coughing up blood.  Call your doctor or nurse advice line now or seek immediate medical care if:    You have severe or increasing pain.  Your leg or foot turns cold or changes colour.  You cannot stand or put weight on your knee.  Your knee looks twisted or bent out of shape.  You cannot move your knee.  You have signs of infection, such as:  Increased pain, swelling, warmth, or redness.  Red streaks leading from the knee.  Pus draining from a place on your knee.  A fever.  You have signs of a blood clot in your leg (called a deep vein thrombosis), such as:  Pain in your calf, back of the knee, thigh, or groin.  Redness and swelling in your leg or groin.  Watch closely for changes in your health, and be sure to contact your doctor or nurse advice line if:    You have tingling, weakness, or numbness in your knee.  You have any new symptoms, such as swelling.  You have bruises from a knee injury that last longer than 2 weeks.  You do not get better as expected.

## 2023-12-19 NOTE — ED PROVIDER NOTE - OBJECTIVE STATEMENT
53-year-old female, no medical history, presents this emergency department for a fall with left knee and ankle pain that occurred 1 hour ago.  Patient states that she was walking when she tripped over the sidewalk and fell.  Causing abnormal, causing an abrasion on the left knee.  Currently complaining of pain on the left knee. Has been in pain since. Pain has gotten worse with time. Has not tried medications, ice, wrapping joint. Denies trauma to head. Denies pain in joint above or below. Denies SOB, CP, calf tenderness/swelling, hemoptysis, recent surgeries, hx of CA, long plane/train/car rides, past clots in legs/lungs. Has been ambulating.

## 2023-12-19 NOTE — ED PROVIDER NOTE - CLINICAL SUMMARY MEDICAL DECISION MAKING FREE TEXT BOX
51 yo F presents for a fall and knee pain  No swelling, erythema, warmth to joint. ROM intact, VSS - septic arthritis unlikely.  Pt does not meet Wells criteria - DVT unlikely  Does not meet Karavel’s signs for Flexor Tenosynovitis  No paresthesia, pallor, paralysis, pulselenness - compartment syndrome unlikely  Xray ordered to rule out/in bony deformities.  Pain control medications ordered      Xray is unremarkable. Pt is stable for discharge.   All results reviewed with pt. Pt understands and agrees with plan. Agreed to follow up with pcp in 2-3 days 53 yo F presents for a fall and knee pain  No swelling, erythema, warmth to joint. ROM intact, VSS - septic arthritis unlikely.  Pt does not meet Wells criteria - DVT unlikely  Does not meet Karavel’s signs for Flexor Tenosynovitis  No paresthesia, pallor, paralysis, pulselenness - compartment syndrome unlikely  Xray ordered to rule out/in bony deformities.  Pain control medications ordered      Xray is unremarkable. Pt is stable for discharge.   All results reviewed with pt. Pt understands and agrees with plan. Agreed to follow up with pcp in 2-3 days

## 2023-12-19 NOTE — ED ADULT NURSE NOTE - NSFALLUNIVINTERV_ED_ALL_ED
Bed/Stretcher in lowest position, wheels locked, appropriate side rails in place/Call bell, personal items and telephone in reach/Instruct patient to call for assistance before getting out of bed/chair/stretcher/Non-slip footwear applied when patient is off stretcher/[place] to call system/Physically safe environment - no spills, clutter or unnecessary equipment/Purposeful proactive rounding/Room/bathroom lighting operational, light cord in reach Bed/Stretcher in lowest position, wheels locked, appropriate side rails in place/Call bell, personal items and telephone in reach/Instruct patient to call for assistance before getting out of bed/chair/stretcher/Non-slip footwear applied when patient is off stretcher/[place] to call system/Physically safe environment - no spills, clutter or unnecessary equipment/Purposeful proactive rounding/Room/bathroom lighting operational, light cord in reach

## 2023-12-19 NOTE — ED PROVIDER NOTE - PATIENT PORTAL LINK FT
You can access the FollowMyHealth Patient Portal offered by [facility] by registering at the following website: [email or web address]. By joining [employer]’s FollowMyHealth portal, you will also be able to view your health information using other applications (apps) compatible with our system. You can access the FollowMyHealth Patient Portal offered by [facility] by registering at the following website: [email or web address]. By joining [employer]’s FollowMyHealth portal, you will also be able to view your health information using other applications (apps) compatible with our system.

## 2023-12-21 ENCOUNTER — APPOINTMENT (OUTPATIENT)
Dept: FAMILY MEDICINE | Facility: CLINIC | Age: 53
End: 2023-12-21
Payer: MEDICAID

## 2023-12-21 PROCEDURE — 99214 OFFICE O/P EST MOD 30 MIN: CPT | Mod: 95

## 2023-12-21 NOTE — HISTORY OF PRESENT ILLNESS
[FreeTextEntry1] : teb  provider patient [facility] [place] discussion  knee mri  [de-identified] : 52 yo f presents to discuss meds  feels as though her meds are working and is content   brain scan was abnormal  pending visit with MS specialist  also following with shikha  also working with therapy as well, pending psychiatry/ add specialist

## 2023-12-21 NOTE — PLAN
[FreeTextEntry1] : knee pain  still with b/l knee pain s/p PT  PT mentioned PT said she has not made expected progress after multiple sessions  adhd  meds refilled   reviewed risks, benefits, side effects, alternatives, regimen

## 2024-01-04 ENCOUNTER — APPOINTMENT (OUTPATIENT)
Dept: MRI IMAGING | Facility: CLINIC | Age: 54
End: 2024-01-04

## 2024-01-15 ENCOUNTER — APPOINTMENT (OUTPATIENT)
Dept: MRI IMAGING | Facility: CLINIC | Age: 54
End: 2024-01-15

## 2024-02-05 ENCOUNTER — APPOINTMENT (OUTPATIENT)
Dept: FAMILY MEDICINE | Facility: CLINIC | Age: 54
End: 2024-02-05
Payer: MEDICAID

## 2024-02-05 VITALS
HEART RATE: 79 BPM | SYSTOLIC BLOOD PRESSURE: 128 MMHG | HEIGHT: 64 IN | TEMPERATURE: 97.3 F | OXYGEN SATURATION: 97 % | BODY MASS INDEX: 23.39 KG/M2 | WEIGHT: 137 LBS | DIASTOLIC BLOOD PRESSURE: 63 MMHG

## 2024-02-05 PROCEDURE — G2211 COMPLEX E/M VISIT ADD ON: CPT | Mod: NC,1L

## 2024-02-05 PROCEDURE — 99214 OFFICE O/P EST MOD 30 MIN: CPT

## 2024-02-05 NOTE — HEALTH RISK ASSESSMENT
[0] : 2) Feeling down, depressed, or hopeless: Not at all (0) [PHQ-2 Negative - No further assessment needed] : PHQ-2 Negative - No further assessment needed [Never] : Never [[id number]] : 0

## 2024-02-05 NOTE — HISTORY OF PRESENT ILLNESS
[FreeTextEntry1] : med discussion   [de-identified] : 52 yo f presents to discuss meds  feels as though her meds are working and is content  interested in refills   still working with neurooptho, will have records sent

## 2024-02-05 NOTE — PLAN
[FreeTextEntry1] : adhd  meds refilled   patient feels as though her meds are not enough interested in ER and IR  will cont both  reviewed risks, benefits, side effects, alternatives, regimen   follow up labs, labs drawn in office

## 2024-02-07 LAB
ALBUMIN SERPL ELPH-MCNC: 4.5 G/DL
ALP BLD-CCNC: 96 U/L
ALT SERPL-CCNC: 44 U/L
ANION GAP SERPL CALC-SCNC: 13 MMOL/L
AST SERPL-CCNC: 38 U/L
BASOPHILS # BLD AUTO: 0.04 K/UL
BASOPHILS NFR BLD AUTO: 0.6 %
BILIRUB SERPL-MCNC: 0.2 MG/DL
BUN SERPL-MCNC: 15 MG/DL
C TRACH RRNA SPEC QL NAA+PROBE: NOT DETECTED
CALCIUM SERPL-MCNC: 9.7 MG/DL
CHLORIDE SERPL-SCNC: 104 MMOL/L
CHOLEST SERPL-MCNC: 237 MG/DL
CO2 SERPL-SCNC: 24 MMOL/L
CREAT SERPL-MCNC: 0.75 MG/DL
EGFR: 96 ML/MIN/1.73M2
EOSINOPHIL # BLD AUTO: 0.09 K/UL
EOSINOPHIL NFR BLD AUTO: 1.3 %
ESTIMATED AVERAGE GLUCOSE: 111 MG/DL
GLUCOSE SERPL-MCNC: 89 MG/DL
HBA1C MFR BLD HPLC: 5.5 %
HCT VFR BLD CALC: 43.2 %
HCV AB SER QL: NONREACTIVE
HCV S/CO RATIO: 0.31 S/CO
HDLC SERPL-MCNC: 79 MG/DL
HGB BLD-MCNC: 14 G/DL
HIV1+2 AB SPEC QL IA.RAPID: NONREACTIVE
IMM GRANULOCYTES NFR BLD AUTO: 0.3 %
LDLC SERPL CALC-MCNC: 124 MG/DL
LYMPHOCYTES # BLD AUTO: 1.51 K/UL
LYMPHOCYTES NFR BLD AUTO: 21.4 %
MAN DIFF?: NORMAL
MCHC RBC-ENTMCNC: 32.4 GM/DL
MCHC RBC-ENTMCNC: 32.9 PG
MCV RBC AUTO: 101.4 FL
MONOCYTES # BLD AUTO: 0.63 K/UL
MONOCYTES NFR BLD AUTO: 8.9 %
N GONORRHOEA RRNA SPEC QL NAA+PROBE: NOT DETECTED
NEUTROPHILS # BLD AUTO: 4.78 K/UL
NEUTROPHILS NFR BLD AUTO: 67.5 %
NONHDLC SERPL-MCNC: 158 MG/DL
PLATELET # BLD AUTO: 301 K/UL
POTASSIUM SERPL-SCNC: 4.6 MMOL/L
PROT SERPL-MCNC: 7 G/DL
RBC # BLD: 4.26 M/UL
RBC # FLD: 13.5 %
SODIUM SERPL-SCNC: 141 MMOL/L
SOURCE AMPLIFICATION: NORMAL
T PALLIDUM AB SER QL IA: NEGATIVE
TRIGL SERPL-MCNC: 200 MG/DL
TSH SERPL-ACNC: 2.06 UIU/ML
WBC # FLD AUTO: 7.07 K/UL

## 2024-03-11 ENCOUNTER — NON-APPOINTMENT (OUTPATIENT)
Age: 54
End: 2024-03-11

## 2024-04-01 ENCOUNTER — APPOINTMENT (OUTPATIENT)
Dept: FAMILY MEDICINE | Facility: CLINIC | Age: 54
End: 2024-04-01

## 2024-04-25 ENCOUNTER — RESULT REVIEW (OUTPATIENT)
Age: 54
End: 2024-04-25

## 2024-04-25 ENCOUNTER — APPOINTMENT (OUTPATIENT)
Dept: ULTRASOUND IMAGING | Facility: CLINIC | Age: 54
End: 2024-04-25
Payer: MEDICAID

## 2024-04-25 ENCOUNTER — OUTPATIENT (OUTPATIENT)
Dept: OUTPATIENT SERVICES | Facility: HOSPITAL | Age: 54
LOS: 1 days | End: 2024-04-25

## 2024-04-25 ENCOUNTER — APPOINTMENT (OUTPATIENT)
Dept: MAMMOGRAPHY | Facility: CLINIC | Age: 54
End: 2024-04-25
Payer: MEDICAID

## 2024-04-25 ENCOUNTER — APPOINTMENT (OUTPATIENT)
Dept: BREAST CENTER | Facility: CLINIC | Age: 54
End: 2024-04-25
Payer: MEDICAID

## 2024-04-25 VITALS
DIASTOLIC BLOOD PRESSURE: 81 MMHG | HEART RATE: 80 BPM | WEIGHT: 131 LBS | SYSTOLIC BLOOD PRESSURE: 114 MMHG | HEIGHT: 64 IN | BODY MASS INDEX: 22.36 KG/M2

## 2024-04-25 DIAGNOSIS — R92.30 DENSE BREASTS, UNSPECIFIED: ICD-10-CM

## 2024-04-25 DIAGNOSIS — Z12.39 ENCOUNTER FOR OTHER SCREENING FOR MALIGNANT NEOPLASM OF BREAST: ICD-10-CM

## 2024-04-25 PROCEDURE — 77063 BREAST TOMOSYNTHESIS BI: CPT | Mod: 26

## 2024-04-25 PROCEDURE — 76641 ULTRASOUND BREAST COMPLETE: CPT | Mod: 26,50

## 2024-04-25 PROCEDURE — 77067 SCR MAMMO BI INCL CAD: CPT | Mod: 26

## 2024-04-25 PROCEDURE — 99213 OFFICE O/P EST LOW 20 MIN: CPT

## 2024-04-25 RX ORDER — ZOLMITRIPTAN 5 MG/1
5 TABLET, FILM COATED ORAL
Qty: 36 | Refills: 1 | Status: DISCONTINUED | COMMUNITY
Start: 2023-07-18 | End: 2024-04-25

## 2024-04-25 RX ORDER — DEXTROAMPHETAMINE SACCHARATE, AMPHETAMINE ASPARTATE, DEXTROAMPHETAMINE SULFATE AND AMPHETAMINE SULFATE 5; 5; 5; 5 MG/1; MG/1; MG/1; MG/1
20 TABLET ORAL
Qty: 30 | Refills: 0 | Status: DISCONTINUED | COMMUNITY
End: 2024-04-25

## 2024-04-25 RX ORDER — SIMETHICONE 125 MG/1
125 TABLET, CHEWABLE ORAL
Qty: 1 | Refills: 0 | Status: DISCONTINUED | COMMUNITY
Start: 2023-05-01 | End: 2024-04-25

## 2024-04-25 RX ORDER — DEXTROAMPHETAMINE SACCHARATE, AMPHETAMINE ASPARTATE, DEXTROAMPHETAMINE SULFATE, AND AMPHETAMINE SULFATE 3.75; 3.75; 3.75; 3.75 MG/1; MG/1; MG/1; MG/1
TABLET ORAL
Refills: 0 | Status: ACTIVE | COMMUNITY

## 2024-04-25 RX ORDER — ZOLMITRIPTAN 5 MG/1
5 SPRAY NASAL
Qty: 3 | Refills: 3 | Status: DISCONTINUED | COMMUNITY
Start: 2023-06-12 | End: 2024-04-25

## 2024-04-25 RX ORDER — LORATADINE 10 MG/1
10 TABLET ORAL
Qty: 30 | Refills: 0 | Status: DISCONTINUED | COMMUNITY
Start: 2023-06-26 | End: 2024-04-25

## 2024-04-25 NOTE — DATA REVIEWED
[FreeTextEntry1] : [facility] mamm[facility] & b/l US: extremely dense. No mammographic/sonographic evidence of malignancy, including corresponding to bilateral palpable regions.Correlation with comprehensive breast exam is recommended as the decision to biopsy a palpable finding should always include clinical factors. Patient meets consensus guidelines for annual screening breast MRI as a supplement to annual screening mammography given strong family history of breast cancer in her mother and diagnostically limited mammogram secondary to dense underlying glandular tissue with an elevated lifetime risk of 51.3%. BI-RADS 2.   5/6/23 ([facility]) MRI: No suspicious enhancement to warrant biopsy. BI-RADS 2.   4/25/24 ([facility]) b/l mammo and US: heterogeneously dense. No mammographic or sonographic evidence of malignancy. BI-RADS 2.

## 2024-04-25 NOTE — PAST MEDICAL HISTORY
[Menstruating] : The patient is menstruating [Excessive Bleeding] : there was excessive bleeding [Irregular Cycle Intervals] : are  irregular [Total Preg ___] : G[unfilled] [Full Term ___] : Full Term: [unfilled] [Premature ___] : Premature: [unfilled] [Abortions ___] : Abortions:[unfilled] [Age At Live Birth ___] : Age at live birth: [unfilled] [Definite ___ (Date)] : the last menstrual period was [unfilled] [History of Hormone Replacement Treatment] : has no history of hormone replacement treatment [de-identified] : 03/23/2023 [FreeTextEntry5] : none [FreeTextEntry6] : none [FreeTextEntry7] : none [FreeTextEntry8] : yes both kids

## 2024-04-25 NOTE — PLAN
[TextEntry] : Bilateral screening mammogram and sonogram due in April 2025 Referral to gynecology Patient to perform self-breast exams as instructed in the office. Patient to follow-up in 1 year after imaging completed.

## 2024-04-25 NOTE — PHYSICAL EXAM
[Normocephalic] : normocephalic [EOMI] : extra ocular movement intact [Supple] : supple [Examined in the supine and seated position] : examined in the supine and seated position [Symmetrical] : symmetrical [No dominant masses] : no dominant masses in right breast  [No dominant masses] : no dominant masses left breast [No Nipple Retraction] : no left nipple retraction [No Nipple Discharge] : no left nipple discharge [No Axillary Lymphadenopathy] : no left axillary lymphadenopathy [No Rashes] : no rashes [No Supraclavicular Adenopathy] : no supraclavicular adenopathy

## 2024-04-25 NOTE — HISTORY OF PRESENT ILLNESS
[FreeTextEntry1] : 53 yo female referred by Dr. Fermin, presents for bilateral palpable breast lumps and a family history of breast cancer. The patient states that she has been able to feel the masses for the past 1-2 years, unsure if they have changed in size. The lumps are associated with tenderness. Pt denies skin lesions/changes, nipple discharge.  Recent breast imaging returned as benign as seen below.  The patient inquired about a gynecology referral.  Due to the family history of breast cancer, I recommended genetic counseling for possible genetic testing.  The patient is amenable and agrees.  MELINA lifetime risk is 41.2% the patients mother was diagnosed with breast cancer at age 80; the patients maternal aunt had breast cancer in her 60's s/p b/l mastectomy. The patients sister had breast cancer diagnosed around age 28; she is not aware of any genetic testing being completed by any family members. The  patients paternal aunt was diagnosed with cancer of the reproductive organs, but is unsure what the primary cancer was.

## 2024-05-23 ENCOUNTER — APPOINTMENT (OUTPATIENT)
Dept: OBGYN | Facility: CLINIC | Age: 54
End: 2024-05-23
Payer: MEDICAID

## 2024-05-23 ENCOUNTER — NON-APPOINTMENT (OUTPATIENT)
Age: 54
End: 2024-05-23

## 2024-05-23 VITALS
HEART RATE: 68 BPM | DIASTOLIC BLOOD PRESSURE: 73 MMHG | BODY MASS INDEX: 22.7 KG/M2 | SYSTOLIC BLOOD PRESSURE: 111 MMHG | WEIGHT: 132.25 LBS

## 2024-05-23 DIAGNOSIS — Z87.898 PERSONAL HISTORY OF OTHER SPECIFIED CONDITIONS: ICD-10-CM

## 2024-05-23 DIAGNOSIS — Z80.3 FAMILY HISTORY OF MALIGNANT NEOPLASM OF BREAST: ICD-10-CM

## 2024-05-23 DIAGNOSIS — Z00.00 ENCOUNTER FOR GENERAL ADULT MEDICAL EXAMINATION W/OUT ABNORMAL FINDINGS: ICD-10-CM

## 2024-05-23 DIAGNOSIS — G43.719 CHRONIC MIGRAINE W/OUT AURA, INTRACTABLE, W/OUT STATUS MIGRAINOSUS: ICD-10-CM

## 2024-05-23 PROCEDURE — 99215 OFFICE O/P EST HI 40 MIN: CPT

## 2024-05-23 PROCEDURE — 99459 PELVIC EXAMINATION: CPT
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
## 2024-05-23 NOTE — HISTORY OF PRESENT ILLNESS
[Y] : Positive pregnancy history [Normal Amount/Duration] :  normal amount and duration [Regular Cycle Intervals] : periods have been regular [Currently Active] : currently active [Patient reported PAP Smear was normal] : Patient reported PAP Smear was normal [Patient reported colonoscopy was normal] : Patient reported colonoscopy was normal [Menarche Age: ____] : age at menarche was [unfilled] [Mammogramdate] : 4/2024 [PapSmeardate] : 12/7/2022 [ColonoscopyDate] : 5/23/2023 [LMPDate] : 5/13/2024 [MensesFreq] : 28 [MensesLength] : 3-8 [PGHxTotal] : 3 [[facility] Center[facility]Living] : 2 [PGHxABSpont] : 1 [FreeTextEntry1] : 5/13/2024

## 2024-05-23 NOTE — PHYSICAL EXAM
[Chaperone Present] : A chaperone was present in the examining room during all aspects of the physical examination [[id number]] : A chaperone was present during the pelvic exam. [Appropriately responsive] : appropriately responsive [Alert] : alert [No Acute Distress] : no acute distress [Soft] : soft [Non-tender] : non-tender [Non-distended] : non-distended [Oriented x3] : oriented x3 [Labia Majora] : normal [Labia Minora] : normal [Dry Mucosa] : dry mucosa [No Bleeding] : There was no active vaginal bleeding [Normal] : normal [Uterine Adnexae] : non-palpable [FreeTextEntry6] : deferred, has w/ Dr. Cadet [Tenderness] : nontender [Enlarged ___ wks] : not enlarged
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
## 2024-05-24 LAB
HBV SURFACE AG SER QL: NONREACTIVE
HCV AB SER QL: NONREACTIVE
HCV S/CO RATIO: 0.3 S/CO
HIV1+2 AB SPEC QL IA.RAPID: NONREACTIVE
T PALLIDUM AB SER QL IA: NEGATIVE

## 2024-05-28 LAB
HBV SURFACE AB SER QL: NONREACTIVE
HPV HIGH+LOW RISK DNA PNL CVX: NOT DETECTED

## 2024-06-03 ENCOUNTER — APPOINTMENT (OUTPATIENT)
Dept: FAMILY MEDICINE | Facility: CLINIC | Age: 54
End: 2024-06-03
Payer: MEDICAID

## 2024-06-03 VITALS
DIASTOLIC BLOOD PRESSURE: 71 MMHG | HEART RATE: 89 BPM | BODY MASS INDEX: 22.02 KG/M2 | SYSTOLIC BLOOD PRESSURE: 110 MMHG | HEIGHT: 64 IN | TEMPERATURE: 98.3 F | OXYGEN SATURATION: 96 % | WEIGHT: 129 LBS

## 2024-06-03 DIAGNOSIS — M17.0 BILATERAL PRIMARY OSTEOARTHRITIS OF KNEE: ICD-10-CM

## 2024-06-03 DIAGNOSIS — H53.9 UNSPECIFIED VISUAL DISTURBANCE: ICD-10-CM

## 2024-06-03 DIAGNOSIS — Z72.51 HIGH RISK HETEROSEXUAL BEHAVIOR: ICD-10-CM

## 2024-06-03 PROCEDURE — 99214 OFFICE O/P EST MOD 30 MIN: CPT

## 2024-06-03 PROCEDURE — G2211 COMPLEX E/M VISIT ADD ON: CPT | Mod: NC,1L

## 2024-06-03 NOTE — HISTORY OF PRESENT ILLNESS
[FreeTextEntry1] : follow up  med refills   [de-identified] : 52 yo f presents to discuss meds  feels as though her meds are working and is content  meds recently refilled   working with neuro, was told not MS, not autoimmune condition either, was told ? old stroke  mentioned feels cross eyed, following with optho  pending appt. with neuro   saw gyn last week, normal std testing   considering hormone replacement therapy but waiting to connect with neuro (stroke risk)

## 2024-06-03 NOTE — PLAN
[FreeTextEntry1] : adhd  meds refilled   will cont both  reviewed risks, benefits, side effects, alternatives, regimen   vision changes will send to optho  pending follow up with optho, neurooptho, neuro   reviewed gyn note with patient  reviewed normal std testing with patient  normal a1c and lipids with patient   abnormal knee xrays  has been months since PT and at home exercises  still with pain  will send for MRI

## 2024-06-03 NOTE — PHYSICAL EXAM
[Normal Outer Ear/Nose] : the outer ears and nose were normal in appearance [Normal] : affect was normal and insight and judgment were intact [de-identified] : left eye moves medially often

## 2024-06-10 ENCOUNTER — TRANSCRIPTION ENCOUNTER (OUTPATIENT)
Age: 54
End: 2024-06-10

## 2024-06-11 ENCOUNTER — APPOINTMENT (OUTPATIENT)
Dept: INTERNAL MEDICINE | Facility: CLINIC | Age: 54
End: 2024-06-11
Payer: MEDICAID

## 2024-06-11 DIAGNOSIS — F32.A ANXIETY DISORDER, UNSPECIFIED: ICD-10-CM

## 2024-06-11 DIAGNOSIS — F41.9 ANXIETY DISORDER, UNSPECIFIED: ICD-10-CM

## 2024-06-11 PROCEDURE — 99213 OFFICE O/P EST LOW 20 MIN: CPT | Mod: 95

## 2024-06-11 RX ORDER — ALPRAZOLAM 0.25 MG/1
0.25 TABLET ORAL DAILY
Qty: 30 | Refills: 0 | Status: DISCONTINUED | COMMUNITY
Start: 2024-06-11 | End: 2024-06-11

## 2024-06-11 RX ORDER — DIAZEPAM 5 MG/1
TABLET ORAL
Refills: 0 | Status: DISCONTINUED | COMMUNITY
End: 2024-06-11

## 2024-06-11 RX ORDER — CLONAZEPAM 0.25 MG/1
0.25 TABLET, ORALLY DISINTEGRATING ORAL
Qty: 15 | Refills: 0 | Status: ACTIVE | COMMUNITY
Start: 2024-06-11 | End: 1900-01-01

## 2024-06-11 NOTE — PHYSICAL EXAM
[No Acute Distress] : no acute distress [Well Nourished] : well nourished [Well Developed] : well developed [Well-Appearing] : well-appearing [No Respiratory Distress] : no respiratory distress  [No Accessory Muscle Use] : no accessory muscle use [Speech Grossly Normal] : speech grossly normal [Normal Affect] : the affect was normal [Normal Mood] : the mood was normal [Normal Insight/Judgement] : insight and judgment were intact
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
## 2024-06-13 ENCOUNTER — APPOINTMENT (OUTPATIENT)
Dept: RADIOLOGY | Facility: CLINIC | Age: 54
End: 2024-06-13
Payer: MEDICAID

## 2024-06-13 ENCOUNTER — OUTPATIENT (OUTPATIENT)
Dept: OUTPATIENT SERVICES | Facility: HOSPITAL | Age: 54
LOS: 1 days | End: 2024-06-13

## 2024-06-13 PROCEDURE — 73552 X-RAY EXAM OF FEMUR 2/>: CPT | Mod: 26,RT

## 2024-06-13 PROCEDURE — 73590 X-RAY EXAM OF LOWER LEG: CPT | Mod: 26,RT

## 2024-06-17 ENCOUNTER — APPOINTMENT (OUTPATIENT)
Dept: MRI IMAGING | Facility: CLINIC | Age: 54
End: 2024-06-17

## 2024-06-20 ENCOUNTER — APPOINTMENT (OUTPATIENT)
Dept: OBGYN | Facility: CLINIC | Age: 54
End: 2024-06-20
Payer: MEDICAID

## 2024-06-20 VITALS — SYSTOLIC BLOOD PRESSURE: 110 MMHG | DIASTOLIC BLOOD PRESSURE: 70 MMHG

## 2024-06-20 PROCEDURE — 99214 OFFICE O/P EST MOD 30 MIN: CPT

## 2024-06-20 NOTE — PHYSICAL EXAM
[Appropriately responsive] : appropriately responsive [Alert] : alert [No Acute Distress] : no acute distress [Oriented x3] : oriented x3 [FreeTextEntry2] : anxious, tearful, not aggressive, sometimes pressured speech but also able to make good eye contact and speak at normal jonathon - just admits to a lot of emotional/personal stress

## 2024-06-20 NOTE — DISCUSSION/SUMMARY
[FreeTextEntry1] : 54yo  here for f/u regarding HRT initiation for perimenopausal symptoms. -Reaffirmed need for neurology clearance prior to E2 therapy, cannot r/o small vessel disease at this time myself -Spoke with office of Dr. Semaj Chowdhury (neuro) regarding appt in 2024, they will try to move date up; awaiting call back  -Counseled patient regarding mood, advised to go to [facility] ED should she feel unsafe/out of control  RTC for HRT initiation dependent on neuro clearance

## 2024-06-21 ENCOUNTER — NON-APPOINTMENT (OUTPATIENT)
Age: 54
End: 2024-06-21

## 2024-06-24 LAB — CYTOLOGY CVX/VAG DOC THIN PREP: ABNORMAL

## 2024-06-25 ENCOUNTER — APPOINTMENT (OUTPATIENT)
Dept: NEUROLOGY | Facility: CLINIC | Age: 54
End: 2024-06-25
Payer: MEDICAID

## 2024-06-25 VITALS
BODY MASS INDEX: 21.56 KG/M2 | HEART RATE: 74 BPM | DIASTOLIC BLOOD PRESSURE: 75 MMHG | OXYGEN SATURATION: 97 % | HEIGHT: 64 IN | SYSTOLIC BLOOD PRESSURE: 111 MMHG | TEMPERATURE: 97.8 F | WEIGHT: 126.25 LBS

## 2024-06-25 DIAGNOSIS — R90.89 OTHER ABNORMAL FINDINGS ON DIAGNOSTIC IMAGING OF CENTRAL NERVOUS SYSTEM: ICD-10-CM

## 2024-06-25 DIAGNOSIS — F90.9 ATTENTION-DEFICIT HYPERACTIVITY DISORDER, UNSPECIFIED TYPE: ICD-10-CM

## 2024-06-25 DIAGNOSIS — H53.2 DIPLOPIA: ICD-10-CM

## 2024-06-25 DIAGNOSIS — G43.E09 CHRONIC MIGRAINE WITH AURA, NOT INTRACTABLE, WITHOUT STATUS MIGRAINOSUS: ICD-10-CM

## 2024-06-25 PROCEDURE — 99205 OFFICE O/P NEW HI 60 MIN: CPT

## 2024-06-25 RX ORDER — SUMATRIPTAN 100 MG/1
100 TABLET, FILM COATED ORAL
Qty: 9 | Refills: 3 | Status: ACTIVE | COMMUNITY
Start: 2024-06-25 | End: 1900-01-01

## 2024-06-25 RX ORDER — DEXTROAMPHETAMINE SACCHARATE, AMPHETAMINE ASPARTATE, DEXTROAMPHETAMINE SULFATE AND AMPHETAMINE SULFATE 2.5; 2.5; 2.5; 2.5 MG/1; MG/1; MG/1; MG/1
10 TABLET ORAL
Qty: 45 | Refills: 0 | Status: ACTIVE | COMMUNITY
Start: 2023-06-26 | End: 1900-01-01

## 2024-06-25 RX ORDER — DEXTROAMPHETAMINE SACCHARATE, AMPHETAMINE ASPARTATE MONOHYDRATE, DEXTROAMPHETAMINE SULFATE AND AMPHETAMINE SULFATE 5; 5; 5; 5 MG/1; MG/1; MG/1; MG/1
20 CAPSULE, EXTENDED RELEASE ORAL
Qty: 30 | Refills: 0 | Status: ACTIVE | COMMUNITY
Start: 2023-06-26 | End: 1900-01-01

## 2024-07-01 DIAGNOSIS — Z79.890 HORMONE REPLACEMENT THERAPY: ICD-10-CM

## 2024-07-03 ENCOUNTER — APPOINTMENT (OUTPATIENT)
Dept: OPHTHALMOLOGY | Facility: CLINIC | Age: 54
End: 2024-07-03
Payer: MEDICAID

## 2024-07-03 ENCOUNTER — NON-APPOINTMENT (OUTPATIENT)
Age: 54
End: 2024-07-03

## 2024-07-03 PROCEDURE — 92250 FUNDUS PHOTOGRAPHY W/I&R: CPT

## 2024-07-03 PROCEDURE — 92004 COMPRE OPH EXAM NEW PT 1/>: CPT

## 2024-07-09 ENCOUNTER — APPOINTMENT (OUTPATIENT)
Dept: ORTHOPEDIC SURGERY | Facility: CLINIC | Age: 54
End: 2024-07-09
Payer: MEDICAID

## 2024-07-09 DIAGNOSIS — M17.0 BILATERAL PRIMARY OSTEOARTHRITIS OF KNEE: ICD-10-CM

## 2024-07-09 PROCEDURE — 99213 OFFICE O/P EST LOW 20 MIN: CPT

## 2024-07-12 ENCOUNTER — RESULT REVIEW (OUTPATIENT)
Age: 54
End: 2024-07-12

## 2024-07-15 ENCOUNTER — RESULT REVIEW (OUTPATIENT)
Age: 54
End: 2024-07-15

## 2024-07-15 ENCOUNTER — APPOINTMENT (OUTPATIENT)
Dept: MRI IMAGING | Facility: CLINIC | Age: 54
End: 2024-07-15
Payer: MEDICAID

## 2024-07-15 ENCOUNTER — OUTPATIENT (OUTPATIENT)
Dept: OUTPATIENT SERVICES | Facility: HOSPITAL | Age: 54
LOS: 1 days | End: 2024-07-15

## 2024-07-15 PROCEDURE — 73721 MRI JNT OF LWR EXTRE W/O DYE: CPT | Mod: 26,LT

## 2024-07-26 ENCOUNTER — APPOINTMENT (OUTPATIENT)
Dept: FAMILY MEDICINE | Facility: CLINIC | Age: 54
End: 2024-07-26

## 2024-08-01 ENCOUNTER — APPOINTMENT (OUTPATIENT)
Dept: FAMILY MEDICINE | Facility: CLINIC | Age: 54
End: 2024-08-01
Payer: MEDICAID

## 2024-08-01 DIAGNOSIS — F90.9 ATTENTION-DEFICIT HYPERACTIVITY DISORDER, UNSPECIFIED TYPE: ICD-10-CM

## 2024-08-01 DIAGNOSIS — R92.30 DENSE BREASTS, UNSPECIFIED: ICD-10-CM

## 2024-08-01 DIAGNOSIS — M76.30 ILIOTIBIAL BAND SYNDROME, UNSPECIFIED LEG: ICD-10-CM

## 2024-08-01 PROCEDURE — 99214 OFFICE O/P EST MOD 30 MIN: CPT

## 2024-08-01 PROCEDURE — G2211 COMPLEX E/M VISIT ADD ON: CPT | Mod: NC,1L

## 2024-08-01 NOTE — PLAN
[FreeTextEntry1] : adhd  uncontrolled  meds refilled   will cont both would like to increase the IR-- 20 mg, up to 2 pills per day    reviewed risks, benefits, side effects, alternatives, regimen   abnormal knee mri itb syndrome encouraged PT and rolling stick

## 2024-08-01 NOTE — HISTORY OF PRESENT ILLNESS
[Home] : at home, [unfilled] , at the time of the visit. [Medical Office: ([facility])___] : at the medical office located in  [Verbal consent obtained from patient] : the patient, [unfilled] [FreeTextEntry1] : med refills  [de-identified] : 54 yo f presents to discuss meds  feels as though her meds are working and is content  meds recently refilled

## 2024-08-20 ENCOUNTER — NON-APPOINTMENT (OUTPATIENT)
Age: 54
End: 2024-08-20

## 2024-08-30 ENCOUNTER — TRANSCRIPTION ENCOUNTER (OUTPATIENT)
Age: 54
End: 2024-08-30

## 2024-09-01 ENCOUNTER — NON-APPOINTMENT (OUTPATIENT)
Age: 54
End: 2024-09-01

## 2024-09-05 ENCOUNTER — TRANSCRIPTION ENCOUNTER (OUTPATIENT)
Age: 54
End: 2024-09-05

## 2024-09-09 ENCOUNTER — APPOINTMENT (OUTPATIENT)
Dept: OBGYN | Facility: CLINIC | Age: 54
End: 2024-09-09

## 2024-09-09 DIAGNOSIS — N63.10 UNSPECIFIED LUMP IN THE RIGHT BREAST, UNSPECIFIED QUADRANT: ICD-10-CM

## 2024-09-09 DIAGNOSIS — Z71.89 OTHER SPECIFIED COUNSELING: ICD-10-CM

## 2024-09-09 PROCEDURE — 99213 OFFICE O/P EST LOW 20 MIN: CPT

## 2024-09-10 NOTE — HISTORY OF PRESENT ILLNESS
[FreeTextEntry1] : Pt presenting for f/u regarding HRT. Pt previously seen for consultation and was requested to obtain clearance from her neurologist before HRT could be initiated. Pt states that she continues to have mood swings and hot flashes. Continues to have irregular periods, jaci-menopausal, LMP last week.  Pt also reports new R axillary mass, soft, mobile, nonpainful.  Pt also requesting for certain information to be removed from her medical record

## 2024-09-10 NOTE — DISCUSSION/SUMMARY
[FreeTextEntry1] : 52 y/o perimenopausal patient presenting for HRT consultation. - Most recent note from neurologist reviewed, no contraindication to HRT at this time - Discussed w/ pt risks/benefits of HRT and pt would like to proceed. Discussed that new finding of breast lump is concerning and requires imaging prior to starting HRT as well as f/u visit with breast surgeon. Discussed w/ pt that HRT meds will be sent however pt to have imaging and f/u w/ breast surgeon prior to initiating. Pt verbalized understanding risks of starting HRT prior to full breast evaluation.  - Pt requests for information to be removed from medical records. Pt met with  and provided with request form which she will file - Pt to RTO in 3 months for symptom f/u after starting HRT - All questions/concerns addressed. Pt expressed understanding - Pt seen and examined with Dr Tulio Agosto MD PGY4  I saw pt with resident and d/w pt care reviewed r/b/a HRT including but not limited to breast cancer risk increased told can NOT start until follows up diagnositic breast imaging / referral back to Dr Chichi Pate

## 2024-09-10 NOTE — PHYSICAL EXAM
[Chaperone Present] : A chaperone was present in the examining room during all aspects of the physical examination [Appropriately responsive] : appropriately responsive [No Acute Distress] : no acute distress [Mass] : mass [Oriented x3] : oriented x3 [FreeTextEntry6] : R lower axillary mass, soft and mobile, approx 2cm

## 2024-09-12 ENCOUNTER — TRANSCRIPTION ENCOUNTER (OUTPATIENT)
Age: 54
End: 2024-09-12

## 2024-09-12 ENCOUNTER — OUTPATIENT (OUTPATIENT)
Dept: OUTPATIENT SERVICES | Facility: HOSPITAL | Age: 54
LOS: 1 days | End: 2024-09-12

## 2024-09-12 ENCOUNTER — RESULT REVIEW (OUTPATIENT)
Age: 54
End: 2024-09-12

## 2024-09-12 ENCOUNTER — APPOINTMENT (OUTPATIENT)
Dept: MAMMOGRAPHY | Facility: CLINIC | Age: 54
End: 2024-09-12
Payer: MEDICAID

## 2024-09-12 ENCOUNTER — APPOINTMENT (OUTPATIENT)
Dept: ULTRASOUND IMAGING | Facility: CLINIC | Age: 54
End: 2024-09-12
Payer: MEDICAID

## 2024-09-12 PROCEDURE — 76642 ULTRASOUND BREAST LIMITED: CPT | Mod: 26,RT

## 2024-09-12 PROCEDURE — 77061 BREAST TOMOSYNTHESIS UNI: CPT | Mod: 26

## 2024-09-12 PROCEDURE — 77065 DX MAMMO INCL CAD UNI: CPT | Mod: 26,RT

## 2024-09-13 ENCOUNTER — TRANSCRIPTION ENCOUNTER (OUTPATIENT)
Age: 54
End: 2024-09-13

## 2024-09-13 RX ORDER — PROGESTERONE 100 MG/1
100 CAPSULE ORAL
Qty: 90 | Refills: 0 | Status: ACTIVE | COMMUNITY
Start: 2024-09-09 | End: 1900-01-01

## 2024-09-13 RX ORDER — ESTRADIOL 0.05 MG/D
0.05 PATCH, EXTENDED RELEASE TRANSDERMAL
Qty: 24 | Refills: 0 | Status: ACTIVE | COMMUNITY
Start: 2024-09-09 | End: 1900-01-01

## 2024-09-15 ENCOUNTER — NON-APPOINTMENT (OUTPATIENT)
Age: 54
End: 2024-09-15

## 2024-09-16 ENCOUNTER — APPOINTMENT (OUTPATIENT)
Dept: NEUROLOGY | Age: 54
End: 2024-09-16
Payer: MEDICAID

## 2024-09-16 VITALS
OXYGEN SATURATION: 98 % | SYSTOLIC BLOOD PRESSURE: 113 MMHG | DIASTOLIC BLOOD PRESSURE: 77 MMHG | HEIGHT: 64 IN | TEMPERATURE: 97.8 F | RESPIRATION RATE: 17 BRPM | HEART RATE: 79 BPM | WEIGHT: 128 LBS | BODY MASS INDEX: 21.85 KG/M2

## 2024-09-16 DIAGNOSIS — G43.E09 CHRONIC MIGRAINE WITH AURA, NOT INTRACTABLE, WITHOUT STATUS MIGRAINOSUS: ICD-10-CM

## 2024-09-16 PROCEDURE — 99214 OFFICE O/P EST MOD 30 MIN: CPT

## 2024-09-16 RX ORDER — ZOLMITRIPTAN 5 MG/1
5 TABLET, FILM COATED ORAL
Qty: 27 | Refills: 2 | Status: ACTIVE | COMMUNITY
Start: 2024-09-16 | End: 1900-01-01

## 2024-09-16 NOTE — HISTORY OF PRESENT ILLNESS
[FreeTextEntry1] : SHAE TAYLOR is a 53 year who returns to follow up for PIÑA  last visit was 6/25/2024 when reviewed the dilated perivascular space on her MRI and I did not think there was an absolute contraindication to hormonal supplementation but advised of some increase clotting risk. I prescribed sumatriptan for migraine  since last visit didn't get to start hormone replacement but now found a right axillary mass and this had to be worked up before starting.  had a 9 days migraine after a recent illness. she took sumatriptan 100mg twice and again next day. also was taking tylenol and aleve.   previous abortives zomig 5mg was better than sumatriptan. rizatriptan never helped. intranasal zomig prohibitively expensive

## 2024-09-16 NOTE — PHYSICAL EXAM
[FreeTextEntry1] : General: this is a pleasant patient in no acute distress    HEENT conjunctiva are normal, no tenderness in head    CV: normal pulses, regular rate and rhythm, no peripheral edema noted    Lungs: breathing is non-labored    abd: soft and non-distended    MSK:  SLR:  TORSTEN:  range of motion:  tinnels:  spurling:  Occipital nerve tenderness:    Mental status:  Alert and oriented to person, place and time, normal speech and comprehension    Cranial Nerves:  extra-occular movements in tact without nystagmus, normal saccades and smooth pursuit, Face symmetric and facial strength symmetric, facial sensation symmetric, reports only binouclar diplopia on b/l lateral gaze but I don't see any clear EOM weakness.    Motor: normal bulk and tone throughout. no abnormal movements. Full 5/5 strength uppers and lower extremities proximally and distally    Sensory: in tact and symmetric to vibration, light tough, temperature    Cerebellar: normal finger-nose-finger bilaterally    Reflexes: 2+ in the upper and lower extremities and symmetric. toes are bilaterally downgoing.    Gait: stable, able to tip toe heel and tandem    Stances:  Romberg: normal.

## 2024-09-19 VITALS — SYSTOLIC BLOOD PRESSURE: 120 MMHG | DIASTOLIC BLOOD PRESSURE: 80 MMHG

## 2024-09-24 ENCOUNTER — TRANSCRIPTION ENCOUNTER (OUTPATIENT)
Age: 54
End: 2024-09-24

## 2024-09-25 ENCOUNTER — NON-APPOINTMENT (OUTPATIENT)
Age: 54
End: 2024-09-25

## 2024-10-04 ENCOUNTER — APPOINTMENT (OUTPATIENT)
Dept: OPHTHALMOLOGY | Facility: CLINIC | Age: 54
End: 2024-10-04
Payer: MEDICAID

## 2024-10-04 ENCOUNTER — NON-APPOINTMENT (OUTPATIENT)
Age: 54
End: 2024-10-04

## 2024-10-04 PROCEDURE — 92004 COMPRE OPH EXAM NEW PT 1/>: CPT

## 2024-10-04 PROCEDURE — 92060 SENSORIMOTOR EXAMINATION: CPT

## 2024-10-04 PROCEDURE — 92134 CPTRZ OPH DX IMG PST SGM RTA: CPT

## 2024-10-08 ENCOUNTER — APPOINTMENT (OUTPATIENT)
Dept: BREAST CENTER | Facility: CLINIC | Age: 54
End: 2024-10-08
Payer: MEDICAID

## 2024-10-08 VITALS — BODY MASS INDEX: 22.02 KG/M2 | HEIGHT: 64 IN | WEIGHT: 129 LBS

## 2024-10-08 DIAGNOSIS — Z71.89 OTHER SPECIFIED COUNSELING: ICD-10-CM

## 2024-10-08 DIAGNOSIS — N63.10 UNSPECIFIED LUMP IN THE RIGHT BREAST, UNSPECIFIED QUADRANT: ICD-10-CM

## 2024-10-08 DIAGNOSIS — R92.30 DENSE BREASTS, UNSPECIFIED: ICD-10-CM

## 2024-10-08 DIAGNOSIS — R92.8 OTHER ABNORMAL AND INCONCLUSIVE FINDINGS ON DIAGNOSTIC IMAGING OF BREAST: ICD-10-CM

## 2024-10-08 DIAGNOSIS — Z12.39 ENCOUNTER FOR OTHER SCREENING FOR MALIGNANT NEOPLASM OF BREAST: ICD-10-CM

## 2024-10-08 PROCEDURE — 99214 OFFICE O/P EST MOD 30 MIN: CPT

## 2024-10-28 ENCOUNTER — APPOINTMENT (OUTPATIENT)
Dept: FAMILY MEDICINE | Facility: CLINIC | Age: 54
End: 2024-10-28

## 2024-11-04 ENCOUNTER — APPOINTMENT (OUTPATIENT)
Dept: FAMILY MEDICINE | Facility: CLINIC | Age: 54
End: 2024-11-04
Payer: MEDICAID

## 2024-11-04 ENCOUNTER — TRANSCRIPTION ENCOUNTER (OUTPATIENT)
Age: 54
End: 2024-11-04

## 2024-11-04 DIAGNOSIS — F90.9 ATTENTION-DEFICIT HYPERACTIVITY DISORDER, UNSPECIFIED TYPE: ICD-10-CM

## 2024-11-04 DIAGNOSIS — M76.30 ILIOTIBIAL BAND SYNDROME, UNSPECIFIED LEG: ICD-10-CM

## 2024-11-04 PROCEDURE — G2211 COMPLEX E/M VISIT ADD ON: CPT | Mod: NC

## 2024-11-04 PROCEDURE — 99214 OFFICE O/P EST MOD 30 MIN: CPT

## 2024-11-11 ENCOUNTER — TRANSCRIPTION ENCOUNTER (OUTPATIENT)
Age: 54
End: 2024-11-11

## 2024-12-09 ENCOUNTER — APPOINTMENT (OUTPATIENT)
Dept: OBGYN | Facility: CLINIC | Age: 54
End: 2024-12-09
Payer: MEDICAID

## 2024-12-09 ENCOUNTER — APPOINTMENT (OUTPATIENT)
Dept: NEUROLOGY | Age: 54
End: 2024-12-09

## 2024-12-09 VITALS
WEIGHT: 130.07 LBS | SYSTOLIC BLOOD PRESSURE: 106 MMHG | DIASTOLIC BLOOD PRESSURE: 78 MMHG | BODY MASS INDEX: 22.33 KG/M2

## 2024-12-09 DIAGNOSIS — Z00.00 ENCOUNTER FOR GENERAL ADULT MEDICAL EXAMINATION W/OUT ABNORMAL FINDINGS: ICD-10-CM

## 2024-12-09 DIAGNOSIS — Z11.3 ENCOUNTER FOR SCREENING FOR INFECTIONS WITH A PREDOMINANTLY SEXUAL MODE OF TRANSMISSION: ICD-10-CM

## 2024-12-09 DIAGNOSIS — Z79.890 HORMONE REPLACEMENT THERAPY: ICD-10-CM

## 2024-12-09 PROCEDURE — 99214 OFFICE O/P EST MOD 30 MIN: CPT

## 2024-12-09 RX ORDER — PROGESTERONE 100 MG/1
100 CAPSULE ORAL DAILY
Qty: 28 | Refills: 3 | Status: ACTIVE | COMMUNITY
Start: 2024-12-09 | End: 1900-01-01

## 2024-12-09 RX ORDER — ESTRADIOL 0.07 MG/D
0.07 PATCH, EXTENDED RELEASE TRANSDERMAL
Qty: 8 | Refills: 3 | Status: ACTIVE | COMMUNITY
Start: 2024-12-09 | End: 1900-01-01

## 2024-12-10 ENCOUNTER — NON-APPOINTMENT (OUTPATIENT)
Age: 54
End: 2024-12-10

## 2024-12-10 LAB
BASOPHILS # BLD AUTO: 0.06 K/UL
BASOPHILS NFR BLD AUTO: 1 %
EOSINOPHIL # BLD AUTO: 0.06 K/UL
EOSINOPHIL NFR BLD AUTO: 1 %
HBV SURFACE AG SER QL: NONREACTIVE
HCT VFR BLD CALC: 39.4 %
HCV AB SER QL: NONREACTIVE
HCV S/CO RATIO: 0.29 S/CO
HGB BLD-MCNC: 12.3 G/DL
HIV1+2 AB SPEC QL IA.RAPID: NONREACTIVE
IMM GRANULOCYTES NFR BLD AUTO: 0.3 %
LYMPHOCYTES # BLD AUTO: 0.95 K/UL
LYMPHOCYTES NFR BLD AUTO: 16 %
MAN DIFF?: NORMAL
MCHC RBC-ENTMCNC: 31.2 G/DL
MCHC RBC-ENTMCNC: 31.9 PG
MCV RBC AUTO: 102.1 FL
MONOCYTES # BLD AUTO: 0.56 K/UL
MONOCYTES NFR BLD AUTO: 9.4 %
NEUTROPHILS # BLD AUTO: 4.29 K/UL
NEUTROPHILS NFR BLD AUTO: 72.3 %
PLATELET # BLD AUTO: 246 K/UL
RBC # BLD: 3.86 M/UL
RBC # FLD: 13.1 %
T PALLIDUM AB SER QL IA: NEGATIVE
WBC # FLD AUTO: 5.94 K/UL

## 2024-12-11 ENCOUNTER — TRANSCRIPTION ENCOUNTER (OUTPATIENT)
Age: 54
End: 2024-12-11

## 2024-12-13 ENCOUNTER — TRANSCRIPTION ENCOUNTER (OUTPATIENT)
Age: 54
End: 2024-12-13

## 2025-01-08 ENCOUNTER — APPOINTMENT (OUTPATIENT)
Dept: HEMATOLOGY ONCOLOGY | Facility: CLINIC | Age: 55
End: 2025-01-08

## 2025-02-06 ENCOUNTER — APPOINTMENT (OUTPATIENT)
Dept: FAMILY MEDICINE | Facility: CLINIC | Age: 55
End: 2025-02-06
Payer: MEDICAID

## 2025-02-06 VITALS
TEMPERATURE: 98.2 F | BODY MASS INDEX: 22.2 KG/M2 | SYSTOLIC BLOOD PRESSURE: 114 MMHG | DIASTOLIC BLOOD PRESSURE: 77 MMHG | WEIGHT: 130 LBS | OXYGEN SATURATION: 96 % | HEIGHT: 64 IN | HEART RATE: 117 BPM

## 2025-02-06 DIAGNOSIS — Z79.890 HORMONE REPLACEMENT THERAPY: ICD-10-CM

## 2025-02-06 DIAGNOSIS — F90.9 ATTENTION-DEFICIT HYPERACTIVITY DISORDER, UNSPECIFIED TYPE: ICD-10-CM

## 2025-02-06 DIAGNOSIS — M17.0 BILATERAL PRIMARY OSTEOARTHRITIS OF KNEE: ICD-10-CM

## 2025-02-06 DIAGNOSIS — Z11.3 ENCOUNTER FOR SCREENING FOR INFECTIONS WITH A PREDOMINANTLY SEXUAL MODE OF TRANSMISSION: ICD-10-CM

## 2025-02-06 PROCEDURE — 93000 ELECTROCARDIOGRAM COMPLETE: CPT

## 2025-02-06 PROCEDURE — 99214 OFFICE O/P EST MOD 30 MIN: CPT | Mod: 25

## 2025-02-06 PROCEDURE — 99396 PREV VISIT EST AGE 40-64: CPT

## 2025-02-07 DIAGNOSIS — Z00.00 ENCOUNTER FOR GENERAL ADULT MEDICAL EXAMINATION W/OUT ABNORMAL FINDINGS: ICD-10-CM

## 2025-02-07 LAB
ALBUMIN SERPL ELPH-MCNC: 4.3 G/DL
ALP BLD-CCNC: 74 U/L
ALT SERPL-CCNC: 17 U/L
ANION GAP SERPL CALC-SCNC: 12 MMOL/L
AST SERPL-CCNC: 23 U/L
BASOPHILS # BLD AUTO: 0.04 K/UL
BASOPHILS NFR BLD AUTO: 0.3 %
BILIRUB SERPL-MCNC: 0.2 MG/DL
BUN SERPL-MCNC: 17 MG/DL
C TRACH RRNA SPEC QL NAA+PROBE: NOT DETECTED
CALCIUM SERPL-MCNC: 8.7 MG/DL
CHLORIDE SERPL-SCNC: 107 MMOL/L
CHOLEST SERPL-MCNC: 198 MG/DL
CO2 SERPL-SCNC: 19 MMOL/L
CREAT SERPL-MCNC: 0.76 MG/DL
EGFR: 94 ML/MIN/1.73M2
EOSINOPHIL # BLD AUTO: 0.02 K/UL
EOSINOPHIL NFR BLD AUTO: 0.2 %
ESTIMATED AVERAGE GLUCOSE: 114 MG/DL
GLUCOSE SERPL-MCNC: 117 MG/DL
HBA1C MFR BLD HPLC: 5.6 %
HCT VFR BLD CALC: 40 %
HDLC SERPL-MCNC: 78 MG/DL
HGB BLD-MCNC: 12.4 G/DL
IMM GRANULOCYTES NFR BLD AUTO: 0.4 %
LDLC SERPL CALC-MCNC: 108 MG/DL
LYMPHOCYTES # BLD AUTO: 0.67 K/UL
LYMPHOCYTES NFR BLD AUTO: 5.4 %
MAN DIFF?: NORMAL
MCHC RBC-ENTMCNC: 29.7 PG
MCHC RBC-ENTMCNC: 31 G/DL
MCV RBC AUTO: 95.9 FL
MONOCYTES # BLD AUTO: 0.69 K/UL
MONOCYTES NFR BLD AUTO: 5.5 %
N GONORRHOEA RRNA SPEC QL NAA+PROBE: NOT DETECTED
NEUTROPHILS # BLD AUTO: 11.05 K/UL
NEUTROPHILS NFR BLD AUTO: 88.2 %
NONHDLC SERPL-MCNC: 120 MG/DL
PLATELET # BLD AUTO: 186 K/UL
POTASSIUM SERPL-SCNC: 5 MMOL/L
PROT SERPL-MCNC: 6.7 G/DL
RBC # BLD: 4.17 M/UL
RBC # FLD: 13.5 %
SODIUM SERPL-SCNC: 138 MMOL/L
SOURCE AMPLIFICATION: NORMAL
T PALLIDUM AB SER QL IA: NEGATIVE
TRIGL SERPL-MCNC: 67 MG/DL
TSH SERPL-ACNC: 1.94 UIU/ML
WBC # FLD AUTO: 12.52 K/UL

## 2025-02-11 ENCOUNTER — NON-APPOINTMENT (OUTPATIENT)
Age: 55
End: 2025-02-11

## 2025-02-11 LAB
HCV AB SER QL: NONREACTIVE
HCV S/CO RATIO: 0.27 S/CO
HIV1+2 AB SPEC QL IA.RAPID: NONREACTIVE

## 2025-02-11 NOTE — ED PROVIDER NOTE - NSICDXNOPASTMEDICALHX_GEN_ALL_ED
Medication: nortriptyline passed protocol.   Last office visit date: 6/26/2024  Next appointment scheduled?: No   Number of refills given: 0     Duplicate request     <-- Click to add NO pertinent Past Medical History

## 2025-03-03 ENCOUNTER — APPOINTMENT (OUTPATIENT)
Dept: OBGYN | Facility: CLINIC | Age: 55
End: 2025-03-03
Payer: MEDICAID

## 2025-03-03 VITALS — BODY MASS INDEX: 23.34 KG/M2 | SYSTOLIC BLOOD PRESSURE: 110 MMHG | DIASTOLIC BLOOD PRESSURE: 81 MMHG | WEIGHT: 136 LBS

## 2025-03-03 DIAGNOSIS — L65.9 NONSCARRING HAIR LOSS, UNSPECIFIED: ICD-10-CM

## 2025-03-03 PROCEDURE — 99214 OFFICE O/P EST MOD 30 MIN: CPT

## 2025-03-03 RX ORDER — MINOXIDIL 2.5 MG/1
2.5 TABLET ORAL DAILY
Qty: 50 | Refills: 2 | Status: ACTIVE | COMMUNITY
Start: 2025-03-03 | End: 1900-01-01

## 2025-03-06 ENCOUNTER — APPOINTMENT (OUTPATIENT)
Dept: OPHTHALMOLOGY | Facility: CLINIC | Age: 55
End: 2025-03-06

## 2025-03-07 ENCOUNTER — APPOINTMENT (OUTPATIENT)
Dept: FAMILY MEDICINE | Facility: CLINIC | Age: 55
End: 2025-03-07
Payer: MEDICAID

## 2025-03-07 DIAGNOSIS — Z00.00 ENCOUNTER FOR GENERAL ADULT MEDICAL EXAMINATION W/OUT ABNORMAL FINDINGS: ICD-10-CM

## 2025-03-07 PROCEDURE — 36415 COLL VENOUS BLD VENIPUNCTURE: CPT

## 2025-03-10 LAB
CHOLEST SERPL-MCNC: 185 MG/DL
HDLC SERPL-MCNC: 71 MG/DL
LDLC SERPL CALC-MCNC: 97 MG/DL
NONHDLC SERPL-MCNC: 114 MG/DL
TRIGL SERPL-MCNC: 98 MG/DL

## 2025-03-19 ENCOUNTER — TRANSCRIPTION ENCOUNTER (OUTPATIENT)
Age: 55
End: 2025-03-19

## 2025-04-03 ENCOUNTER — APPOINTMENT (OUTPATIENT)
Dept: MAMMOGRAPHY | Facility: CLINIC | Age: 55
End: 2025-04-03
Payer: MEDICAID

## 2025-04-03 ENCOUNTER — RESULT REVIEW (OUTPATIENT)
Age: 55
End: 2025-04-03

## 2025-04-03 ENCOUNTER — APPOINTMENT (OUTPATIENT)
Dept: BREAST CENTER | Facility: CLINIC | Age: 55
End: 2025-04-03
Payer: MEDICAID

## 2025-04-03 ENCOUNTER — OUTPATIENT (OUTPATIENT)
Dept: OUTPATIENT SERVICES | Facility: HOSPITAL | Age: 55
LOS: 1 days | End: 2025-04-03

## 2025-04-03 ENCOUNTER — APPOINTMENT (OUTPATIENT)
Dept: ULTRASOUND IMAGING | Facility: CLINIC | Age: 55
End: 2025-04-03
Payer: MEDICAID

## 2025-04-03 VITALS
SYSTOLIC BLOOD PRESSURE: 110 MMHG | HEART RATE: 64 BPM | WEIGHT: 136 LBS | BODY MASS INDEX: 23.22 KG/M2 | HEIGHT: 64 IN | DIASTOLIC BLOOD PRESSURE: 72 MMHG

## 2025-04-03 DIAGNOSIS — Z79.890 HORMONE REPLACEMENT THERAPY: ICD-10-CM

## 2025-04-03 DIAGNOSIS — Z87.898 PERSONAL HISTORY OF OTHER SPECIFIED CONDITIONS: ICD-10-CM

## 2025-04-03 DIAGNOSIS — Z12.39 ENCOUNTER FOR OTHER SCREENING FOR MALIGNANT NEOPLASM OF BREAST: ICD-10-CM

## 2025-04-03 DIAGNOSIS — R92.30 DENSE BREASTS, UNSPECIFIED: ICD-10-CM

## 2025-04-03 PROCEDURE — 76641 ULTRASOUND BREAST COMPLETE: CPT | Mod: 26,50

## 2025-04-03 PROCEDURE — 77067 SCR MAMMO BI INCL CAD: CPT | Mod: 26

## 2025-04-03 PROCEDURE — 99213 OFFICE O/P EST LOW 20 MIN: CPT

## 2025-04-03 PROCEDURE — 77063 BREAST TOMOSYNTHESIS BI: CPT | Mod: 26

## 2025-04-07 ENCOUNTER — TRANSCRIPTION ENCOUNTER (OUTPATIENT)
Age: 55
End: 2025-04-07

## 2025-04-07 ENCOUNTER — NON-APPOINTMENT (OUTPATIENT)
Age: 55
End: 2025-04-07

## 2025-04-15 ENCOUNTER — TRANSCRIPTION ENCOUNTER (OUTPATIENT)
Age: 55
End: 2025-04-15

## 2025-05-19 ENCOUNTER — NON-APPOINTMENT (OUTPATIENT)
Age: 55
End: 2025-05-19

## 2025-05-29 ENCOUNTER — APPOINTMENT (OUTPATIENT)
Dept: FAMILY MEDICINE | Facility: CLINIC | Age: 55
End: 2025-05-29

## 2025-06-09 ENCOUNTER — APPOINTMENT (OUTPATIENT)
Dept: FAMILY MEDICINE | Facility: CLINIC | Age: 55
End: 2025-06-09

## 2025-06-25 ENCOUNTER — NON-APPOINTMENT (OUTPATIENT)
Age: 55
End: 2025-06-25

## 2025-06-30 ENCOUNTER — APPOINTMENT (OUTPATIENT)
Dept: FAMILY MEDICINE | Facility: CLINIC | Age: 55
End: 2025-06-30
Payer: MEDICAID

## 2025-06-30 VITALS
WEIGHT: 136 LBS | BODY MASS INDEX: 23.22 KG/M2 | HEIGHT: 64 IN | OXYGEN SATURATION: 95 % | DIASTOLIC BLOOD PRESSURE: 71 MMHG | TEMPERATURE: 98.6 F | SYSTOLIC BLOOD PRESSURE: 111 MMHG | HEART RATE: 90 BPM

## 2025-06-30 PROCEDURE — 99214 OFFICE O/P EST MOD 30 MIN: CPT

## 2025-06-30 PROCEDURE — G2211 COMPLEX E/M VISIT ADD ON: CPT | Mod: NC

## 2025-06-30 RX ORDER — HYDROXYZINE HYDROCHLORIDE 25 MG/1
25 TABLET ORAL
Qty: 180 | Refills: 0 | Status: ACTIVE | COMMUNITY
Start: 2025-06-30 | End: 1900-01-01

## 2025-08-06 ENCOUNTER — APPOINTMENT (OUTPATIENT)
Dept: PULMONOLOGY | Facility: CLINIC | Age: 55
End: 2025-08-06

## 2025-08-08 ENCOUNTER — LABORATORY RESULT (OUTPATIENT)
Age: 55
End: 2025-08-08

## 2025-08-08 ENCOUNTER — APPOINTMENT (OUTPATIENT)
Dept: PULMONOLOGY | Facility: CLINIC | Age: 55
End: 2025-08-08
Payer: MEDICAID

## 2025-08-08 VITALS
HEIGHT: 64 IN | SYSTOLIC BLOOD PRESSURE: 120 MMHG | RESPIRATION RATE: 12 BRPM | WEIGHT: 135 LBS | DIASTOLIC BLOOD PRESSURE: 77 MMHG | OXYGEN SATURATION: 98 % | BODY MASS INDEX: 23.05 KG/M2 | TEMPERATURE: 98.1 F | HEART RATE: 77 BPM

## 2025-08-08 DIAGNOSIS — R06.02 SHORTNESS OF BREATH: ICD-10-CM

## 2025-08-08 DIAGNOSIS — Z77.120 CONTACT WITH AND (SUSPECTED) EXPOSURE TO MOLD (TOXIC): ICD-10-CM

## 2025-08-08 PROCEDURE — 99204 OFFICE O/P NEW MOD 45 MIN: CPT

## 2025-08-08 PROCEDURE — G2211 COMPLEX E/M VISIT ADD ON: CPT | Mod: NC

## 2025-08-09 ENCOUNTER — LABORATORY RESULT (OUTPATIENT)
Age: 55
End: 2025-08-09

## 2025-08-11 ENCOUNTER — NON-APPOINTMENT (OUTPATIENT)
Age: 55
End: 2025-08-11

## 2025-08-11 LAB
A ALTERNATA IGE QN: <0.1 KUA/L
A FUMIGATUS IGE QN: <0.1 KUA/L
BASOPHILS # BLD AUTO: 0.06 K/UL
BASOPHILS NFR BLD AUTO: 1.1 %
C HERBARUM IGE QN: <0.1 KUA/L
DEPRECATED A ALTERNATA IGE RAST QL: 0
DEPRECATED A FUMIGATUS IGE RAST QL: 0
DEPRECATED C HERBARUM IGE RAST QL: 0
DEPRECATED KAPPA LC FREE/LAMBDA SER: 1.32 RATIO
DEPRECATED P NOTATUM IGE RAST QL: 0
DEPRECATED S ROSTRATA IGE RAST QL: 0
EOSINOPHIL # BLD AUTO: 0.1 K/UL
EOSINOPHIL NFR BLD AUTO: 1.8 %
HCT VFR BLD CALC: 44.7 %
HGB BLD-MCNC: 14.2 G/DL
IGA SERPL-MCNC: 261 MG/DL
IGG SERPL-MCNC: 797 MG/DL
IGM SERPL-MCNC: 92 MG/DL
IMM GRANULOCYTES NFR BLD AUTO: 0.2 %
KAPPA LC CSF-MCNC: 1.08 MG/DL
KAPPA LC SERPL-MCNC: 1.43 MG/DL
LYMPHOCYTES # BLD AUTO: 1.07 K/UL
LYMPHOCYTES NFR BLD AUTO: 19.5 %
MAN DIFF?: NORMAL
MCHC RBC-ENTMCNC: 31.8 G/DL
MCHC RBC-ENTMCNC: 32.1 PG
MCV RBC AUTO: 100.9 FL
MONOCYTES # BLD AUTO: 0.55 K/UL
MONOCYTES NFR BLD AUTO: 10 %
NEUTROPHILS # BLD AUTO: 3.69 K/UL
NEUTROPHILS NFR BLD AUTO: 67.4 %
P NOTATUM IGE QN: <0.1 KUA/L
PLATELET # BLD AUTO: 242 K/UL
RBC # BLD: 4.43 M/UL
RBC # FLD: 13.2 %
S ROSTRATA IGE QN: <0.1 KUA/L
STRONGYLOIDES AB SER IA-ACNC: NEGATIVE
TOTAL IGE SMQN RAST: 22 KU/L
VIT B12 SERPL-MCNC: >2000 PG/ML
WBC # FLD AUTO: 5.48 K/UL

## 2025-08-14 ENCOUNTER — TRANSCRIPTION ENCOUNTER (OUTPATIENT)
Age: 55
End: 2025-08-14

## 2025-08-14 LAB
ALTERNARIA TENUIS/ALTERNATA IGG: <2 MCG/ML
ASPER FUMCAPG(M3): 24 MCG/ML
AUREOBASCAPG(M12): <2 MCG/ML
GALACTOMANNAN AG SERPL QL IA: 0.1 INDEX
LACEYELLA SACCHARI IGG: 6.5 MCG/ML
MICROPOLYCAPG(M22): <2 MCG/ML
PENIC CHRYCAPG(M1): 14.8 MCG/ML
PHOMA BETAE IGG: 2.6 MCG/ML
TRICHODERMA VIRIDE IGG: 2.1 MCG/ML

## 2025-08-18 ENCOUNTER — TRANSCRIPTION ENCOUNTER (OUTPATIENT)
Age: 55
End: 2025-08-18

## 2025-09-02 ENCOUNTER — TRANSCRIPTION ENCOUNTER (OUTPATIENT)
Age: 55
End: 2025-09-02

## 2025-09-02 ENCOUNTER — APPOINTMENT (OUTPATIENT)
Dept: PULMONOLOGY | Facility: CLINIC | Age: 55
End: 2025-09-02

## 2025-09-04 ENCOUNTER — APPOINTMENT (OUTPATIENT)
Dept: PULMONOLOGY | Facility: CLINIC | Age: 55
End: 2025-09-04
Payer: MEDICAID

## 2025-09-04 ENCOUNTER — TRANSCRIPTION ENCOUNTER (OUTPATIENT)
Age: 55
End: 2025-09-04

## 2025-09-04 VITALS
SYSTOLIC BLOOD PRESSURE: 108 MMHG | TEMPERATURE: 98.2 F | HEART RATE: 78 BPM | WEIGHT: 138.5 LBS | HEIGHT: 64 IN | OXYGEN SATURATION: 95 % | DIASTOLIC BLOOD PRESSURE: 70 MMHG | BODY MASS INDEX: 23.64 KG/M2 | RESPIRATION RATE: 12 BRPM

## 2025-09-04 PROCEDURE — 94618 PULMONARY STRESS TESTING: CPT

## 2025-09-04 PROCEDURE — 94727 GAS DIL/WSHOT DETER LNG VOL: CPT

## 2025-09-04 PROCEDURE — 99214 OFFICE O/P EST MOD 30 MIN: CPT | Mod: 25

## 2025-09-04 PROCEDURE — 94729 DIFFUSING CAPACITY: CPT

## 2025-09-04 PROCEDURE — 94010 BREATHING CAPACITY TEST: CPT

## 2025-09-04 RX ORDER — BUDESONIDE AND FORMOTEROL FUMARATE DIHYDRATE 160; 4.5 UG/1; UG/1
160-4.5 AEROSOL RESPIRATORY (INHALATION)
Qty: 1 | Refills: 1 | Status: ACTIVE | COMMUNITY
Start: 2025-09-04 | End: 1900-01-01

## 2025-09-16 ENCOUNTER — APPOINTMENT (OUTPATIENT)
Dept: PULMONOLOGY | Facility: CLINIC | Age: 55
End: 2025-09-16

## 2025-09-18 ENCOUNTER — TRANSCRIPTION ENCOUNTER (OUTPATIENT)
Age: 55
End: 2025-09-18

## 2025-09-18 RX ORDER — PROGESTERONE 100 MG/1
100 CAPSULE ORAL
Qty: 90 | Refills: 3 | Status: ACTIVE | COMMUNITY
Start: 2025-09-18 | End: 1900-01-01